# Patient Record
Sex: MALE | Race: ASIAN | NOT HISPANIC OR LATINO | Employment: FULL TIME | URBAN - METROPOLITAN AREA
[De-identification: names, ages, dates, MRNs, and addresses within clinical notes are randomized per-mention and may not be internally consistent; named-entity substitution may affect disease eponyms.]

---

## 2022-11-15 ENCOUNTER — APPOINTMENT (OUTPATIENT)
Dept: CARDIOLOGY | Facility: MEDICAL CENTER | Age: 32
End: 2022-11-15
Attending: NURSE PRACTITIONER
Payer: COMMERCIAL

## 2022-11-15 ENCOUNTER — APPOINTMENT (OUTPATIENT)
Dept: RADIOLOGY | Facility: MEDICAL CENTER | Age: 32
End: 2022-11-15
Attending: EMERGENCY MEDICINE
Payer: COMMERCIAL

## 2022-11-15 ENCOUNTER — HOSPITAL ENCOUNTER (OUTPATIENT)
Facility: MEDICAL CENTER | Age: 32
End: 2022-11-16
Attending: EMERGENCY MEDICINE | Admitting: HOSPITALIST
Payer: COMMERCIAL

## 2022-11-15 DIAGNOSIS — I25.10 CORONARY ARTERY DISEASE DUE TO CALCIFIED CORONARY LESION: ICD-10-CM

## 2022-11-15 DIAGNOSIS — R07.89 OTHER CHEST PAIN: ICD-10-CM

## 2022-11-15 DIAGNOSIS — R73.9 HYPERGLYCEMIA: ICD-10-CM

## 2022-11-15 DIAGNOSIS — I25.84 CORONARY ARTERY DISEASE DUE TO CALCIFIED CORONARY LESION: ICD-10-CM

## 2022-11-15 PROBLEM — R07.9 CHEST PAIN: Status: ACTIVE | Noted: 2022-11-15

## 2022-11-15 LAB
ALBUMIN SERPL BCP-MCNC: 5.1 G/DL (ref 3.2–4.9)
ALBUMIN/GLOB SERPL: 1.6 G/DL
ALP SERPL-CCNC: 66 U/L (ref 30–99)
ALT SERPL-CCNC: 67 U/L (ref 2–50)
ANION GAP SERPL CALC-SCNC: 13 MMOL/L (ref 7–16)
AST SERPL-CCNC: 28 U/L (ref 12–45)
BASOPHILS # BLD AUTO: 0.8 % (ref 0–1.8)
BASOPHILS # BLD: 0.06 K/UL (ref 0–0.12)
BILIRUB SERPL-MCNC: 0.6 MG/DL (ref 0.1–1.5)
BUN SERPL-MCNC: 12 MG/DL (ref 8–22)
CALCIUM SERPL-MCNC: 9.7 MG/DL (ref 8.5–10.5)
CHLORIDE SERPL-SCNC: 98 MMOL/L (ref 96–112)
CO2 SERPL-SCNC: 22 MMOL/L (ref 20–33)
CREAT SERPL-MCNC: 0.76 MG/DL (ref 0.5–1.4)
EKG IMPRESSION: NORMAL
EOSINOPHIL # BLD AUTO: 0.46 K/UL (ref 0–0.51)
EOSINOPHIL NFR BLD: 5.9 % (ref 0–6.9)
ERYTHROCYTE [DISTWIDTH] IN BLOOD BY AUTOMATED COUNT: 44.1 FL (ref 35.9–50)
EST. AVERAGE GLUCOSE BLD GHB EST-MCNC: 148 MG/DL
GFR SERPLBLD CREATININE-BSD FMLA CKD-EPI: 122 ML/MIN/1.73 M 2
GLOBULIN SER CALC-MCNC: 3.1 G/DL (ref 1.9–3.5)
GLUCOSE SERPL-MCNC: 251 MG/DL (ref 65–99)
HBA1C MFR BLD: 6.8 % (ref 4–5.6)
HCT VFR BLD AUTO: 45.9 % (ref 42–52)
HGB BLD-MCNC: 15.6 G/DL (ref 14–18)
IMM GRANULOCYTES # BLD AUTO: 0.01 K/UL (ref 0–0.11)
IMM GRANULOCYTES NFR BLD AUTO: 0.1 % (ref 0–0.9)
LYMPHOCYTES # BLD AUTO: 2.26 K/UL (ref 1–4.8)
LYMPHOCYTES NFR BLD: 29.1 % (ref 22–41)
MCH RBC QN AUTO: 33 PG (ref 27–33)
MCHC RBC AUTO-ENTMCNC: 34 G/DL (ref 33.7–35.3)
MCV RBC AUTO: 97 FL (ref 81.4–97.8)
MONOCYTES # BLD AUTO: 0.47 K/UL (ref 0–0.85)
MONOCYTES NFR BLD AUTO: 6.1 % (ref 0–13.4)
NEUTROPHILS # BLD AUTO: 4.5 K/UL (ref 1.82–7.42)
NEUTROPHILS NFR BLD: 58 % (ref 44–72)
NRBC # BLD AUTO: 0 K/UL
NRBC BLD-RTO: 0 /100 WBC
NT-PROBNP SERPL IA-MCNC: <5 PG/ML (ref 0–125)
PLATELET # BLD AUTO: 335 K/UL (ref 164–446)
PMV BLD AUTO: 9.4 FL (ref 9–12.9)
POTASSIUM SERPL-SCNC: 3.9 MMOL/L (ref 3.6–5.5)
PROT SERPL-MCNC: 8.2 G/DL (ref 6–8.2)
RBC # BLD AUTO: 4.73 M/UL (ref 4.7–6.1)
SODIUM SERPL-SCNC: 133 MMOL/L (ref 135–145)
TROPONIN T SERPL-MCNC: 7 NG/L (ref 6–19)
TROPONIN T SERPL-MCNC: 7 NG/L (ref 6–19)
TROPONIN T SERPL-MCNC: <6 NG/L (ref 6–19)
WBC # BLD AUTO: 7.8 K/UL (ref 4.8–10.8)

## 2022-11-15 PROCEDURE — 99218 PR INITIAL OBSERVATION CARE,LEVL I: CPT | Performed by: NURSE PRACTITIONER

## 2022-11-15 PROCEDURE — 99285 EMERGENCY DEPT VISIT HI MDM: CPT

## 2022-11-15 PROCEDURE — 71045 X-RAY EXAM CHEST 1 VIEW: CPT

## 2022-11-15 PROCEDURE — 85025 COMPLETE CBC W/AUTO DIFF WBC: CPT

## 2022-11-15 PROCEDURE — 93005 ELECTROCARDIOGRAM TRACING: CPT | Performed by: NURSE PRACTITIONER

## 2022-11-15 PROCEDURE — G0378 HOSPITAL OBSERVATION PER HR: HCPCS

## 2022-11-15 PROCEDURE — 96372 THER/PROPH/DIAG INJ SC/IM: CPT

## 2022-11-15 PROCEDURE — 83036 HEMOGLOBIN GLYCOSYLATED A1C: CPT

## 2022-11-15 PROCEDURE — 93005 ELECTROCARDIOGRAM TRACING: CPT | Performed by: EMERGENCY MEDICINE

## 2022-11-15 PROCEDURE — 80053 COMPREHEN METABOLIC PANEL: CPT

## 2022-11-15 PROCEDURE — A9270 NON-COVERED ITEM OR SERVICE: HCPCS | Performed by: NURSE PRACTITIONER

## 2022-11-15 PROCEDURE — 700102 HCHG RX REV CODE 250 W/ 637 OVERRIDE(OP): Performed by: NURSE PRACTITIONER

## 2022-11-15 PROCEDURE — 36415 COLL VENOUS BLD VENIPUNCTURE: CPT

## 2022-11-15 PROCEDURE — 700111 HCHG RX REV CODE 636 W/ 250 OVERRIDE (IP): Performed by: NURSE PRACTITIONER

## 2022-11-15 PROCEDURE — 83880 ASSAY OF NATRIURETIC PEPTIDE: CPT

## 2022-11-15 PROCEDURE — 93005 ELECTROCARDIOGRAM TRACING: CPT

## 2022-11-15 PROCEDURE — 84484 ASSAY OF TROPONIN QUANT: CPT

## 2022-11-15 RX ORDER — HYDRALAZINE HYDROCHLORIDE 20 MG/ML
20 INJECTION INTRAMUSCULAR; INTRAVENOUS EVERY 6 HOURS PRN
Status: DISCONTINUED | OUTPATIENT
Start: 2022-11-15 | End: 2022-11-16 | Stop reason: HOSPADM

## 2022-11-15 RX ORDER — LISINOPRIL 10 MG/1
5 TABLET ORAL DAILY
Status: DISCONTINUED | OUTPATIENT
Start: 2022-11-16 | End: 2022-11-16 | Stop reason: HOSPADM

## 2022-11-15 RX ORDER — OMEPRAZOLE 20 MG/1
20 CAPSULE, DELAYED RELEASE ORAL DAILY
Status: DISCONTINUED | OUTPATIENT
Start: 2022-11-16 | End: 2022-11-16 | Stop reason: HOSPADM

## 2022-11-15 RX ORDER — AMOXICILLIN 250 MG
2 CAPSULE ORAL 2 TIMES DAILY
Status: DISCONTINUED | OUTPATIENT
Start: 2022-11-15 | End: 2022-11-16 | Stop reason: HOSPADM

## 2022-11-15 RX ORDER — ASPIRIN 81 MG/1
324 TABLET, CHEWABLE ORAL DAILY
Status: DISCONTINUED | OUTPATIENT
Start: 2022-11-15 | End: 2022-11-15

## 2022-11-15 RX ORDER — BISACODYL 10 MG
10 SUPPOSITORY, RECTAL RECTAL
Status: DISCONTINUED | OUTPATIENT
Start: 2022-11-15 | End: 2022-11-16 | Stop reason: HOSPADM

## 2022-11-15 RX ORDER — LISINOPRIL 5 MG/1
5 TABLET ORAL DAILY
Status: ON HOLD | COMMUNITY
End: 2022-11-16 | Stop reason: SDUPTHER

## 2022-11-15 RX ORDER — METOPROLOL SUCCINATE 25 MG/1
25 TABLET, EXTENDED RELEASE ORAL DAILY
Status: ON HOLD | COMMUNITY
End: 2022-11-16 | Stop reason: SDUPTHER

## 2022-11-15 RX ORDER — HEPARIN SODIUM 5000 [USP'U]/ML
5000 INJECTION, SOLUTION INTRAVENOUS; SUBCUTANEOUS EVERY 8 HOURS
Status: DISCONTINUED | OUTPATIENT
Start: 2022-11-15 | End: 2022-11-16 | Stop reason: HOSPADM

## 2022-11-15 RX ORDER — ASPIRIN 325 MG
325 TABLET ORAL DAILY
Status: DISCONTINUED | OUTPATIENT
Start: 2022-11-15 | End: 2022-11-15

## 2022-11-15 RX ORDER — ASPIRIN 300 MG/1
300 SUPPOSITORY RECTAL DAILY
Status: DISCONTINUED | OUTPATIENT
Start: 2022-11-15 | End: 2022-11-15

## 2022-11-15 RX ORDER — ATORVASTATIN CALCIUM 80 MG/1
80 TABLET, FILM COATED ORAL NIGHTLY
Status: DISCONTINUED | OUTPATIENT
Start: 2022-11-15 | End: 2022-11-16 | Stop reason: HOSPADM

## 2022-11-15 RX ORDER — OMEPRAZOLE 20 MG/1
20 CAPSULE, DELAYED RELEASE ORAL DAILY
COMMUNITY

## 2022-11-15 RX ORDER — POLYETHYLENE GLYCOL 3350 17 G/17G
1 POWDER, FOR SOLUTION ORAL
Status: DISCONTINUED | OUTPATIENT
Start: 2022-11-15 | End: 2022-11-16 | Stop reason: HOSPADM

## 2022-11-15 RX ORDER — ATORVASTATIN CALCIUM 80 MG/1
80 TABLET, FILM COATED ORAL NIGHTLY
Status: ON HOLD | COMMUNITY
End: 2022-11-16 | Stop reason: SDUPTHER

## 2022-11-15 RX ADMIN — TICAGRELOR 90 MG: 90 TABLET ORAL at 18:25

## 2022-11-15 RX ADMIN — HEPARIN SODIUM 5000 UNITS: 5000 INJECTION, SOLUTION INTRAVENOUS; SUBCUTANEOUS at 22:00

## 2022-11-15 RX ADMIN — ATORVASTATIN CALCIUM 80 MG: 80 TABLET, FILM COATED ORAL at 22:00

## 2022-11-15 ASSESSMENT — LIFESTYLE VARIABLES
TOTAL SCORE: 0
HAVE YOU EVER FELT YOU SHOULD CUT DOWN ON YOUR DRINKING: NO
TOTAL SCORE: 0
HOW MANY TIMES IN THE PAST YEAR HAVE YOU HAD 5 OR MORE DRINKS IN A DAY: 0
ON A TYPICAL DAY WHEN YOU DRINK ALCOHOL HOW MANY DRINKS DO YOU HAVE: 1
AVERAGE NUMBER OF DAYS PER WEEK YOU HAVE A DRINK CONTAINING ALCOHOL: 1
ALCOHOL_USE: YES
TOTAL SCORE: 0
HAVE PEOPLE ANNOYED YOU BY CRITICIZING YOUR DRINKING: NO
EVER FELT BAD OR GUILTY ABOUT YOUR DRINKING: NO
CONSUMPTION TOTAL: NEGATIVE
EVER HAD A DRINK FIRST THING IN THE MORNING TO STEADY YOUR NERVES TO GET RID OF A HANGOVER: NO
DOES PATIENT WANT TO STOP DRINKING: NO

## 2022-11-15 ASSESSMENT — ENCOUNTER SYMPTOMS
SHORTNESS OF BREATH: 1
HEADACHES: 1
FOCAL WEAKNESS: 0
DIARRHEA: 0
COUGH: 0
NECK PAIN: 1
CHILLS: 0
FALLS: 0
FEVER: 0
NAUSEA: 0
DIZZINESS: 1
VOMITING: 1

## 2022-11-15 ASSESSMENT — PAIN DESCRIPTION - PAIN TYPE
TYPE: ACUTE PAIN
TYPE: ACUTE PAIN

## 2022-11-15 ASSESSMENT — HEART SCORE
TROPONIN: LESS THAN OR EQUAL TO NORMAL LIMIT
AGE: <45
HISTORY: MODERATELY SUSPICIOUS
HEART SCORE: 3
RISK FACTORS: >2 RISK FACTORS OR HX OF ATHEROSCLEROTIC DISEASE
ECG: NORMAL

## 2022-11-15 ASSESSMENT — PATIENT HEALTH QUESTIONNAIRE - PHQ9
1. LITTLE INTEREST OR PLEASURE IN DOING THINGS: NOT AT ALL
SUM OF ALL RESPONSES TO PHQ9 QUESTIONS 1 AND 2: 0
2. FEELING DOWN, DEPRESSED, IRRITABLE, OR HOPELESS: NOT AT ALL

## 2022-11-15 NOTE — H&P
Hospital Medicine History & Physical Note    Date of Service  11/15/2022    Primary Care Physician  No primary care provider on file.    Consultants  none      Code Status  Full Code    Chief Complaint  Chief Complaint   Patient presents with    Dizziness    N/V    Shortness of Breath     On/off for months        History of Presenting Illness  Isidro Crow is a 32 y.o. male who presented 11/15/2022 for evaluation of acute chest pain and shortness of breath.  Patient states that yesterday morning when he awoke he noted 5 out of 10 left-sided chest pain extending up into his neck.  States that it occurred when he was waking up, also had episode of dizziness and 1 episode of emesis.  Following this he went back to sleep and pain did improve down to a 3 out of 10 with some persistent dizziness.  Talking to on-call nurse he presented to the emergency department for additional work-up per their recommendation.  Patient does have a history of myocardial infarction in February 2022.  Patient was flying from [place] to [place], when he came off the plane he had crushing chest pain, shortness of breath and syncope, was admitted emergently to [facility] and underwent a cardiac catheterization with 3 stents placed.  Since that time patient has been Yazdanism about taking his medications, recently moved to the [facility] approximately 2 months ago and has not been able to establish care with a primary or a cardiologist.  Additionally patient is reporting intermittent episodes of shortness of breath that have been occurring over the last few months since moving to the [facility].  Currently patient is denying any chest pain, vital signs are stable.    I discussed the plan of care with patient and family.    Review of Systems  Review of Systems   Constitutional:  Negative for chills, fever and malaise/fatigue.   Respiratory:  Positive for shortness of breath. Negative for cough.     Cardiovascular:  Positive for chest pain. Negative for leg swelling.   Gastrointestinal:  Positive for vomiting. Negative for diarrhea and nausea.   Genitourinary:  Negative for dysuria.   Musculoskeletal:  Positive for neck pain. Negative for falls.   Neurological:  Positive for dizziness and headaches. Negative for focal weakness.     Past Medical History   has a past medical history of MI (myocardial infarction) (HCC).    Surgical History   has no past surgical history on file.     Family History  family history is not on file.   Family history reviewed with patient. There is family history that is pertinent to the chief complaint.     Social History       Allergies  No Known Allergies    Medications  Prior to Admission Medications   Prescriptions Last Dose Informant Patient Reported? Taking?   aspirin EC (ECOTRIN) 81 MG Tablet Delayed Response 11/15/2022 at 0730  Yes Yes   Sig: Take 1 Tablet by mouth every day.   atorvastatin (LIPITOR) 80 MG tablet 11/14/2022 at 2130  Yes Yes   Sig: Take 1 Tablet by mouth every evening.   lisinopril (PRINIVIL) 5 MG Tab 11/15/2022 at 0730  Yes Yes   Sig: Take 1 Tablet by mouth every day.   metoprolol SR (TOPROL XL) 25 MG TABLET SR 24 HR 11/15/2022 at 0730  Yes Yes   Sig: Take 1 Tablet by mouth every day.   omeprazole (PRILOSEC) 20 MG delayed-release capsule 11/15/2022 at 0730  Yes Yes   Sig: Take 1 Capsule by mouth every day.   ticagrelor (BRILINTA) 90 MG Tab tablet 11/15/2022 at 0730  Yes Yes   Sig: Take 1 Tablet by mouth 2 times a day.      Facility-Administered Medications: None       Physical Exam  Temp:  [36.6 °C (97.9 °F)] 36.6 °C (97.9 °F)  Pulse:  [81] 81  Resp:  [18] 18  BP: (138)/(90) 138/90  SpO2:  [99 %] 99 %  Blood Pressure: (!) 138/90   Temperature: 36.6 °C (97.9 °F)   Pulse: 81   Respiration: 18   Pulse Oximetry: 99 %       Physical Exam  Vitals and nursing note reviewed.   Constitutional:       General: He is not in acute distress.     Appearance: He is  well-developed and overweight. He is not ill-appearing.   HENT:      Head: Normocephalic and atraumatic.      Mouth/Throat:      Mouth: Mucous membranes are moist.   Cardiovascular:      Rate and Rhythm: Normal rate and regular rhythm.      Pulses: Normal pulses.      Heart sounds: Heart sounds are distant. No murmur heard.  Pulmonary:      Effort: Pulmonary effort is normal.      Breath sounds: Normal breath sounds. No decreased air movement. No wheezing.   Abdominal:      General: Abdomen is protuberant. There is no distension.      Palpations: Abdomen is soft.      Tenderness: There is no abdominal tenderness. There is no guarding.   Musculoskeletal:      Right lower leg: No edema.      Left lower leg: No edema.   Neurological:      General: No focal deficit present.      Mental Status: He is alert and oriented to person, place, and time.      Cranial Nerves: No cranial nerve deficit.      Motor: No weakness.   Psychiatric:         Attention and Perception: Attention normal.         Mood and Affect: Mood normal.         Speech: Speech normal.         Behavior: Behavior normal. Behavior is cooperative.       Laboratory:  Recent Labs     11/15/22  1207   WBC 7.8   RBC 4.73   HEMOGLOBIN 15.6   HEMATOCRIT 45.9   MCV 97.0   MCH 33.0   MCHC 34.0   RDW 44.1   PLATELETCT 335   MPV 9.4     Recent Labs     11/15/22  1207   SODIUM 133*   POTASSIUM 3.9   CHLORIDE 98   CO2 22   GLUCOSE 251*   BUN 12   CREATININE 0.76   CALCIUM 9.7     Recent Labs     11/15/22  1207   ALTSGPT 67*   ASTSGOT 28   ALKPHOSPHAT 66   TBILIRUBIN 0.6   GLUCOSE 251*         Recent Labs     11/15/22  1207   NTPROBNP <5         Recent Labs     11/15/22  1207   TROPONINT 7       Imaging:  DX-CHEST-PORTABLE (1 VIEW)   Final Result      1.  No acute cardiac or pulmonary abnormalities are identified.      EC-ECHOCARDIOGRAM COMPLETE W/O CONT    (Results Pending)       X-Ray:  My impression is: no acute cardio pulmonary process noted  EKG:  I have reviewed,  "unable to compare to prior studies    Assessment/Plan:  Justification for Admission Status  I anticipate this patient is appropriate for observation status at this time because chest pain, rule out for cardiac cause    Patient will need a Med/Surg bed on MEDICAL service .  The need is secondary to chest pain rule out.    * Chest pain- (present on admission)  Assessment & Plan  The ASCVD Risk score (Darron CORDOVA, et al., 2019) failed to calculate for the following reasons:    The 2019 ASCVD risk score is only valid for ages 40 to 79  Patient has significant cardiac history concern for additional cardiac ischemia  Will be admitted for additional cardiac work-up  Continuous telemetry, trend troponins, plan for stress test, given shortness of breath and cardiac history we will also order echocardiogram    Hyperglycemia  Assessment & Plan  States that he is \"borderline\" diabetic however blood sugar is 250 on admission  Check A1c      VTE prophylaxis: heparin ppx  "

## 2022-11-15 NOTE — ED PROVIDER NOTES
ED Provider Note    Scribed for Vel Bolaños M.D. by Pepito Mayfield. 11/15/2022, 1:20 PM.    Primary care provider: No primary care provider reported.  Means of arrival: Walk-in  History obtained from: Patient  History limited by: None    CHIEF COMPLAINT  Chief Complaint   Patient presents with    Dizziness    N/V    Shortness of Breath     On/off for months        HPI  Isidro Crow is a 32 y.o. male who presents to the Emergency Department for evaluation of shortness of breath. Onset yesterday. He states that he was sleeping and when he woke up he felt short of breath and dizzy. Shortly after this he had several episodes of vomiting. He describes his vomit as consisting of mainly bile. He notes he has previously been prescribed medication for acid reflux when he was living in [place]. He recently moved to [facility]. He has a history of a CAD, and he had a heart attack and stent placement in February, 2022. He notes that since this he has had intermittent chest pain and he had some chest pain yesterday.  The pain is in the center of his chest and feels like his previous cardiac chest pain.  Nothing makes it better nothing makes it worse.  He denies any fevers or recent head trauma. He has a family history of heart enlargement with his grandmother, but denies any other family history of heart issues. He is not followed by cardiology. He also has a history of sleep apnea. He admits to smoking cigarette and vaping previously, but he has now quit. There are no known alleviating or exacerbating factors.     REVIEW OF SYSTEMS  Review of Systems   Constitutional:  Negative for fever.   Respiratory:  Positive for shortness of breath.    Cardiovascular:  Positive for chest pain (intermittent).   Gastrointestinal:  Positive for vomiting.   Neurological:  Positive for dizziness.   All other systems reviewed and are negative.    PAST MEDICAL HISTORY   has a past medical history of MI (myocardial infarction)  "([facility]    SURGICAL HISTORY  patient denies any surgical history    SOCIAL HISTORY      Social History     Substance and Sexual Activity   Drug Use None noted       FAMILY HISTORY  None noted.     CURRENT MEDICATIONS  Home Medications       Reviewed by Alisa Falk R.N. (Registered Nurse) on 11/15/22 at 1153  Med List Status: Unable to Obtain     Medication Last Dose Status        Patient Eduard Taking any Medications                           ALLERGIES  No Known Allergies    PHYSICAL EXAM  VITAL SIGNS: BP (!) 138/90   Pulse 81   Temp 36.6 °C (97.9 °F) (Temporal)   Resp 18   Ht 1.727 m (5' 8\")   Wt 99.4 kg (219 lb 2.2 oz)   SpO2 99%   BMI 33.32 kg/m²   Vitals reviewed.  Constitutional: Well developed, Well nourished, No acute distress, Non-toxic appearance.   HENT: Normocephalic, Atraumatic, Bilateral external ears normal, Oropharynx moist, No oral exudates, Nose normal.   Eyes: PERRL, EOMI, Conjunctiva normal, No discharge.   Neck: Normal range of motion, No tenderness, Supple, No stridor.   Cardiovascular: Normal heart rate, Normal rhythm, No murmurs, No rubs, No gallops.   Thorax & Lungs: Normal breath sounds, No respiratory distress, No wheezing, No chest tenderness.   Abdomen: Bowel sounds normal, Soft, No tenderness  Skin: Warm, Dry, No erythema, No rash.   Back: No tenderness, No CVA tenderness.   Musculoskeletal: Good range of motion in all major joints. No edema. No tenderness to palpation or major deformities noted.   Neurologic: Alert, Normal motor function, Normal sensory function, No focal deficits noted.   Psychiatric: Affect normal    LABS  Results for orders placed or performed during the hospital encounter of 11/15/22   CBC WITH DIFFERENTIAL   Result Value Ref Range    WBC 7.8 4.8 - 10.8 K/uL    RBC 4.73 4.70 - 6.10 M/uL    Hemoglobin 15.6 14.0 - 18.0 g/dL    Hematocrit 45.9 42.0 - 52.0 %    MCV 97.0 81.4 - 97.8 fL    MCH 33.0 27.0 - 33.0 pg    MCHC 34.0 33.7 - 35.3 g/dL    RDW 44.1 35.9 - " 50.0 fL    Platelet Count 335 164 - 446 K/uL    MPV 9.4 9.0 - 12.9 fL    Neutrophils-Polys 58.00 44.00 - 72.00 %    Lymphocytes 29.10 22.00 - 41.00 %    Monocytes 6.10 0.00 - 13.40 %    Eosinophils 5.90 0.00 - 6.90 %    Basophils 0.80 0.00 - 1.80 %    Immature Granulocytes 0.10 0.00 - 0.90 %    Nucleated RBC 0.00 /100 WBC    Neutrophils (Absolute) 4.50 1.82 - 7.42 K/uL    Lymphs (Absolute) 2.26 1.00 - 4.80 K/uL    Monos (Absolute) 0.47 0.00 - 0.85 K/uL    Eos (Absolute) 0.46 0.00 - 0.51 K/uL    Baso (Absolute) 0.06 0.00 - 0.12 K/uL    Immature Granulocytes (abs) 0.01 0.00 - 0.11 K/uL    NRBC (Absolute) 0.00 K/uL   COMP METABOLIC PANEL   Result Value Ref Range    Sodium 133 (L) 135 - 145 mmol/L    Potassium 3.9 3.6 - 5.5 mmol/L    Chloride 98 96 - 112 mmol/L    Co2 22 20 - 33 mmol/L    Anion Gap 13.0 7.0 - 16.0    Glucose 251 (H) 65 - 99 mg/dL    Bun 12 8 - 22 mg/dL    Creatinine 0.76 0.50 - 1.40 mg/dL    Calcium 9.7 8.5 - 10.5 mg/dL    AST(SGOT) 28 12 - 45 U/L    ALT(SGPT) 67 (H) 2 - 50 U/L    Alkaline Phosphatase 66 30 - 99 U/L    Total Bilirubin 0.6 0.1 - 1.5 mg/dL    Albumin 5.1 (H) 3.2 - 4.9 g/dL    Total Protein 8.2 6.0 - 8.2 g/dL    Globulin 3.1 1.9 - 3.5 g/dL    A-G Ratio 1.6 g/dL   TROPONIN   Result Value Ref Range    Troponin T 7 6 - 19 ng/L   proBrain Natriuretic Peptide, NT   Result Value Ref Range    NT-proBNP <5 0 - 125 pg/mL   ESTIMATED GFR   Result Value Ref Range    GFR (CKD-EPI) 122 >60 mL/min/1.73 m 2   EKG   Result Value Ref Range    Report       [facility] Emergency Dept.    Test Date:  2022-11-15  Pt Name:    DAGO FITZGERALD                 Department: ER  MRN:        [id number]                      Room:  Gender:     Male                         Technician: [id number]  :        1990                   Requested By:ER TRIAGE PROTOCOL  Order #:    [id number]                    Reading MD: ALBER NATHAN. AMD    Measurements  Intervals                                Axis  Rate:        79                           P:          52  AR:         181                          QRS:        81  QRSD:       87                           T:          46  QT:         352  QTc:        404    Interpretive Statements  Sinus rhythm  Borderline ST elevation, anterior leads  No previous ECG available for comparison  Electronically Signed On 11- 13:28:08 PST by ALBER NATHAN. [facility]        All labs reviewed by me.    EKG Interpretation  Interpreted by me as above.     RADIOLOGY  DX-CHEST-PORTABLE (1 VIEW)   Final Result      1.  No acute cardiac or pulmonary abnormalities are identified.        The radiologist's interpretation of all radiological studies have been reviewed by me.    COURSE & MEDICAL DECISION MAKING  Pertinent Labs & Imaging studies reviewed. (See chart for details)    Obtained and reviewed past medical records.    1:20 PM Patient seen and examined at bedside. The patient presents with shortness of breath, and the differential diagnosis includes but is not limited to ACS, pneumonia pneumothorax and PE.  Chest wall pain, Anxiety. Ordered for EKG, DX-Chest, CBC with differential, CMP, Troponin, and proBNP to evaluate.     Patient is no evidence of pneumonia.  Clinical history does not suggest a dissection.  Nausea is benign without any tenderness or peritonitis.  The patient has a history of 3 cardiac stents reports compliance with his medications.  The patient has known CAD.  His heart score is 4.  Because of his known heart disease and ongoing chest pain he requires at least observation troponins may be a stress test.  I discussed case with the hospitalist and the patient hospitalist for further work-up and treatment.  He been having a multitude of symptoms that are crescendoing and this is concerning.      1:57 PM Paged [facility] Hospitalist.    2:02 PM I discussed the patient's case and the above findings with [facility] Hospitalist who agrees to evaluate the patient for hospitalization.           DISPOSITION:  Patient will be hospitalized in the [facility]  in guarded condition.     FINAL IMPRESSION  1. Other chest pain    2. Coronary artery disease due to calcified coronary lesion          Pepito PHILIP (Scribe), am scribing for, and in the presence of, Vel Bolaños M.D..    Electronically signed by: Pepito Mayfield (Scribe), 11/15/2022    Vel PHILIP M.D. personally performed the services described in this documentation, as scribed by Pepito Mayfield in my presence, and it is both accurate and complete.    The note accurately reflects work and decisions made by me.  Vel Bolaños M.D.  11/15/2022  3:10 PM

## 2022-11-15 NOTE — ED TRIAGE NOTES
Chief Complaint   Patient presents with    Dizziness    N/V    Shortness of Breath     On/off since after cardiac stent      Pt ambulated to triage with above complaints. Pt said that he had recent heart attack beginning of this year with stent placement x3. Since procedure he has been having sob on/off .

## 2022-11-15 NOTE — ASSESSMENT & PLAN NOTE
The ASCVD Risk score (Darron CORDOVA, et al., 2019) failed to calculate for the following reasons:    The 2019 ASCVD risk score is only valid for ages 40 to 79  Patient has significant cardiac history concern for additional cardiac ischemia  Will be admitted for additional cardiac work-up  Continuous telemetry, trend troponins, plan for stress test, given shortness of breath and cardiac history we will also order echocardiogram

## 2022-11-15 NOTE — ED NOTES
Pt had stent placed in feb, reports SOB yesterday, denies today, dizziness this morning, denies  Currently  Pt reports he called a nurse hot line and was told to come to ER for EKG

## 2022-11-15 NOTE — ED NOTES
Moved to room 2 for cardiac monitoring, Pt placed on cardiac monitor, continuous pulse ox and BP. Call light in reach, side rails up, bed locked and in lowest position, warm blanket provided. Denies any needs at this time. No acute distress noted.

## 2022-11-15 NOTE — ED NOTES
Med rec updated and complete. Allergies reviewed. Confirmed name and date of birth.        No antibiotic use in last 30 days        Home pharmacy [facility] [phone]

## 2022-11-16 ENCOUNTER — APPOINTMENT (OUTPATIENT)
Dept: CARDIOLOGY | Facility: MEDICAL CENTER | Age: 32
End: 2022-11-16
Attending: NURSE PRACTITIONER
Payer: COMMERCIAL

## 2022-11-16 ENCOUNTER — APPOINTMENT (OUTPATIENT)
Dept: RADIOLOGY | Facility: MEDICAL CENTER | Age: 32
End: 2022-11-16
Attending: NURSE PRACTITIONER
Payer: COMMERCIAL

## 2022-11-16 VITALS
WEIGHT: 219.14 LBS | HEIGHT: 68 IN | DIASTOLIC BLOOD PRESSURE: 68 MMHG | HEART RATE: 122 BPM | RESPIRATION RATE: 18 BRPM | OXYGEN SATURATION: 94 % | SYSTOLIC BLOOD PRESSURE: 112 MMHG | TEMPERATURE: 98.3 F | BODY MASS INDEX: 33.21 KG/M2

## 2022-11-16 LAB
ANION GAP SERPL CALC-SCNC: 16 MMOL/L (ref 7–16)
BUN SERPL-MCNC: 11 MG/DL (ref 8–22)
CALCIUM SERPL-MCNC: 9.6 MG/DL (ref 8.5–10.5)
CHLORIDE SERPL-SCNC: 99 MMOL/L (ref 96–112)
CHOLEST SERPL-MCNC: 181 MG/DL (ref 100–199)
CO2 SERPL-SCNC: 20 MMOL/L (ref 20–33)
CREAT SERPL-MCNC: 0.8 MG/DL (ref 0.5–1.4)
EKG IMPRESSION: NORMAL
EKG IMPRESSION: NORMAL
ERYTHROCYTE [DISTWIDTH] IN BLOOD BY AUTOMATED COUNT: 43.2 FL (ref 35.9–50)
GFR SERPLBLD CREATININE-BSD FMLA CKD-EPI: 121 ML/MIN/1.73 M 2
GLUCOSE SERPL-MCNC: 147 MG/DL (ref 65–99)
HCT VFR BLD AUTO: 47.4 % (ref 42–52)
HDLC SERPL-MCNC: 36 MG/DL
HGB BLD-MCNC: 16.1 G/DL (ref 14–18)
LDLC SERPL CALC-MCNC: 83 MG/DL
LV EJECT FRACT MOD 2C 99903: 39.67
LV EJECT FRACT MOD 4C 99902: 48.86
LV EJECT FRACT MOD BP 99901: 45.89
MCH RBC QN AUTO: 32.7 PG (ref 27–33)
MCHC RBC AUTO-ENTMCNC: 34 G/DL (ref 33.7–35.3)
MCV RBC AUTO: 96.1 FL (ref 81.4–97.8)
PLATELET # BLD AUTO: 348 K/UL (ref 164–446)
PMV BLD AUTO: 9.3 FL (ref 9–12.9)
POTASSIUM SERPL-SCNC: 4 MMOL/L (ref 3.6–5.5)
RBC # BLD AUTO: 4.93 M/UL (ref 4.7–6.1)
SODIUM SERPL-SCNC: 135 MMOL/L (ref 135–145)
TRIGL SERPL-MCNC: 311 MG/DL (ref 0–149)
WBC # BLD AUTO: 8.5 K/UL (ref 4.8–10.8)

## 2022-11-16 PROCEDURE — A9270 NON-COVERED ITEM OR SERVICE: HCPCS | Performed by: NURSE PRACTITIONER

## 2022-11-16 PROCEDURE — 93306 TTE W/DOPPLER COMPLETE: CPT | Mod: 26 | Performed by: STUDENT IN AN ORGANIZED HEALTH CARE EDUCATION/TRAINING PROGRAM

## 2022-11-16 PROCEDURE — G0378 HOSPITAL OBSERVATION PER HR: HCPCS

## 2022-11-16 PROCEDURE — 80061 LIPID PANEL: CPT

## 2022-11-16 PROCEDURE — 93010 ELECTROCARDIOGRAM REPORT: CPT | Mod: 76 | Performed by: STUDENT IN AN ORGANIZED HEALTH CARE EDUCATION/TRAINING PROGRAM

## 2022-11-16 PROCEDURE — 99217 PR OBSERVATION CARE DISCHARGE: CPT | Mod: FS | Performed by: NURSE PRACTITIONER

## 2022-11-16 PROCEDURE — A9502 TC99M TETROFOSMIN: HCPCS

## 2022-11-16 PROCEDURE — 90686 IIV4 VACC NO PRSV 0.5 ML IM: CPT | Performed by: NURSE PRACTITIONER

## 2022-11-16 PROCEDURE — 80048 BASIC METABOLIC PNL TOTAL CA: CPT

## 2022-11-16 PROCEDURE — 700102 HCHG RX REV CODE 250 W/ 637 OVERRIDE(OP): Performed by: NURSE PRACTITIONER

## 2022-11-16 PROCEDURE — 90471 IMMUNIZATION ADMIN: CPT

## 2022-11-16 PROCEDURE — 700111 HCHG RX REV CODE 636 W/ 250 OVERRIDE (IP): Performed by: NURSE PRACTITIONER

## 2022-11-16 PROCEDURE — 96372 THER/PROPH/DIAG INJ SC/IM: CPT

## 2022-11-16 PROCEDURE — 93306 TTE W/DOPPLER COMPLETE: CPT

## 2022-11-16 PROCEDURE — 85027 COMPLETE CBC AUTOMATED: CPT

## 2022-11-16 RX ORDER — OMEGA-3 FATTY ACIDS/FISH OIL 300-1000MG
1000 CAPSULE ORAL 2 TIMES DAILY
Qty: 180 CAPSULE | Refills: 0 | Status: SHIPPED | OUTPATIENT
Start: 2022-11-16 | End: 2023-02-14

## 2022-11-16 RX ORDER — OMEGA-3/DHA/EPA/FISH OIL 300-1000MG
1000 CAPSULE ORAL 2 TIMES DAILY
Status: CANCELLED | COMMUNITY
Start: 2022-11-16

## 2022-11-16 RX ORDER — METOPROLOL SUCCINATE 25 MG/1
25 TABLET, EXTENDED RELEASE ORAL DAILY
Qty: 90 TABLET | Refills: 0 | Status: SHIPPED | OUTPATIENT
Start: 2022-11-16 | End: 2023-02-14

## 2022-11-16 RX ORDER — ATORVASTATIN CALCIUM 80 MG/1
80 TABLET, FILM COATED ORAL NIGHTLY
Qty: 90 TABLET | Refills: 0 | Status: SHIPPED | OUTPATIENT
Start: 2022-11-16 | End: 2023-02-14

## 2022-11-16 RX ORDER — LISINOPRIL 5 MG/1
5 TABLET ORAL DAILY
Qty: 90 TABLET | Refills: 0 | Status: SHIPPED | OUTPATIENT
Start: 2022-11-16 | End: 2023-02-14

## 2022-11-16 RX ADMIN — OMEPRAZOLE 20 MG: 20 CAPSULE, DELAYED RELEASE ORAL at 05:11

## 2022-11-16 RX ADMIN — HEPARIN SODIUM 5000 UNITS: 5000 INJECTION, SOLUTION INTRAVENOUS; SUBCUTANEOUS at 05:10

## 2022-11-16 RX ADMIN — ASPIRIN 81 MG: 81 TABLET, COATED ORAL at 05:10

## 2022-11-16 RX ADMIN — LISINOPRIL 5 MG: 10 TABLET ORAL at 05:12

## 2022-11-16 RX ADMIN — TICAGRELOR 90 MG: 90 TABLET ORAL at 05:11

## 2022-11-16 RX ADMIN — INFLUENZA A VIRUS A/VICTORIA/2570/2019 IVR-215 (H1N1) ANTIGEN (FORMALDEHYDE INACTIVATED), INFLUENZA A VIRUS A/DARWIN/9/2021 SAN-010 (H3N2) ANTIGEN (FORMALDEHYDE INACTIVATED), INFLUENZA B VIRUS B/PHUKET/3073/2013 ANTIGEN (FORMALDEHYDE INACTIVATED), AND INFLUENZA B VIRUS B/MICHIGAN/01/2021 ANTIGEN (FORMALDEHYDE INACTIVATED) 0.5 ML: 15; 15; 15; 15 INJECTION, SUSPENSION INTRAMUSCULAR at 14:14

## 2022-11-16 ASSESSMENT — PAIN DESCRIPTION - PAIN TYPE: TYPE: ACUTE PAIN

## 2022-11-16 NOTE — PROGRESS NOTES
Pt discharged with discharge paperwork. Reviewed medications and appointments with pt. Pt and pt's wife acknowledged all instructions. Pt preferred to ambulate off the unit with family and belongings.

## 2022-11-16 NOTE — PROGRESS NOTES
1939- This RN messaged CELINA Toure. This RN was looking at pt's EKG done and noticed some ST elevation. Pt denies any symptoms at the moment.       1940- This RN did not received any orders.

## 2022-11-16 NOTE — PROGRESS NOTES
Pt ate chocolate cake with his dinner tray, supposed to be getting treadmill stress test tomorrow. Diet order changed, pt's wife instructed to bring in sneakers for stress test tomorrow, pt updated not to eat chocolate or drink any caff. Will pass along to nightshift RN.    · Continue home statin

## 2022-11-16 NOTE — DISCHARGE SUMMARY
Discharge Summary    CHIEF COMPLAINT ON ADMISSION  Chief Complaint   Patient presents with    Dizziness    N/V    Shortness of Breath     On/off for months        Reason for Admission  Sent by MD     Admission Date  11/15/2022    CODE STATUS  Full Code    HPI & HOSPITAL COURSE    Isidro Crow is a 32 y.o. male who presented 11/15/2022 for evaluation of acute chest pain and shortness of breath.  Patient states that yesterday morning when he awoke he noted 5 out of 10 left-sided chest pain extending up into his neck.  States that it occurred when he was waking up, also had episode of dizziness and 1 episode of emesis.  Following this he went back to sleep and pain did improve down to a 3 out of 10 with some persistent dizziness.  Talking to on-call nurse he presented to the emergency department for additional work-up per their recommendation.  Patient does have a history of myocardial infarction in February 2022.  Patient was flying from [place] to [place], when he came off the plane he had crushing chest pain, shortness of breath and syncope, was admitted emergently to [facility] and underwent a cardiac catheterization with 3 stents placed.  Since that time patient has been Quaker about taking his medications, recently moved to the [facility] approximately 2 months ago and has not been able to establish care with a primary or a cardiologist.  Additionally patient is reporting intermittent episodes of shortness of breath that have been occurring over the last few months since moving to the [facility].  The emergency department EKG had ST elevation in anterior leads, no baseline EKG to compare with.  Given cardiac history patient was admitted for additional cardiac work-up.      Underwent echocardiogram which showed normal LV function, EF of 55%, normal RV size and function, and no valvular abnormalities.  Patient remained on telemetry monitor with no events  overnight.  Troponins x3 were negative.  Also underwent nuclear medicine stress test which showed no area of infarction or ischemia.  Panel was checked, did show elevated triglycerides, patient already on max dose of atorvastatin, recommended adding omega-3 and following up with a primary.  If repeat lipid panel does not show improvement would recommend additional review per PCP's recommendations.  Additionally A1c noted to be elevated at 6.8, patient started on metformin, discussed importance of diet and exercise being incorporated into his lifestyle.    I have performed a physical exam and reviewed and updated ROS and Plan today (11/16/2022). In review of yesterday's note (11/15/2022), there are no changes except as documented above.      Therefore, he is discharged in good and stable condition to home with close outpatient follow-up.    The patient recovered much more quickly than anticipated on admission.    Discharge Date  11/16/2022    FOLLOW UP ITEMS POST DISCHARGE  Elevated triglycerides, continue high-dose atorvastatin, start omega-3 twice a day, follow-up with primary, may need to adjust medications for better control    New diagnosis diabetes, A1c elevated to 6.8, start metformin, establish care with PCP and follow-up    Courage diet and exercise changes for both high cholesterol and elevated glucose.    DISCHARGE DIAGNOSES  Principal Problem:    Chest pain POA: Yes  Active Problems:    Hyperglycemia POA: Unknown  Resolved Problems:    * No resolved hospital problems. *      FOLLOW UP  No future appointments.  No follow-up provider specified.    MEDICATIONS ON DISCHARGE     Medication List        START taking these medications        Instructions   metFORMIN 500 MG Tabs  Commonly known as: GLUCOPHAGE   Take 1 Tablet by mouth 2 times a day with meals for 90 days.  Dose: 500 mg     Omega 3 1000 MG Caps   Take 1,000 mg by mouth 2 times a day for 90 days.  Dose: 1,000 mg            CONTINUE taking these  medications        Instructions   aspirin EC 81 MG Tbec  Commonly known as: ECOTRIN   Take 1 Tablet by mouth every day.  Dose: 81 mg     atorvastatin 80 MG tablet  Commonly known as: LIPITOR   Take 1 Tablet by mouth every evening for 90 days.  Dose: 80 mg     lisinopril 5 MG Tabs  Commonly known as: PRINIVIL   Take 1 Tablet by mouth every day for 90 days.  Dose: 5 mg     metoprolol SR 25 MG Tb24  Commonly known as: TOPROL XL   Take 1 Tablet by mouth every day for 90 days.  Dose: 25 mg     omeprazole 20 MG delayed-release capsule  Commonly known as: PRILOSEC   Take 1 Capsule by mouth every day.  Dose: 20 mg     ticagrelor 90 MG Tabs tablet  Commonly known as: Brilinta   Take 1 Tablet by mouth 2 times a day for 90 days.  Dose: 90 mg              Allergies  No Known Allergies    DIET  Orders Placed This Encounter   Procedures    Diet Order Diet: Cardiac; Miscellaneous modifications: (optional): No Decaf, No Caffeine(for test)     Standing Status:   Standing     Number of Occurrences:   1     Order Specific Question:   Diet:     Answer:   Cardiac [6]     Order Specific Question:   Miscellaneous modifications: (optional)     Answer:   No Decaf, No Caffeine(for test) [11]       ACTIVITY  As tolerated.  Weight bearing as tolerated    CONSULTATIONS  None    PROCEDURES  Treadmill Stress Test  Echocardiogram    LABORATORY  Lab Results   Component Value Date    SODIUM 135 11/16/2022    POTASSIUM 4.0 11/16/2022    CHLORIDE 99 11/16/2022    CO2 20 11/16/2022    GLUCOSE 147 (H) 11/16/2022    BUN 11 11/16/2022    CREATININE 0.80 11/16/2022        Lab Results   Component Value Date    WBC 8.5 11/16/2022    HEMOGLOBIN 16.1 11/16/2022    HEMATOCRIT 47.4 11/16/2022    PLATELETCT 348 11/16/2022      NM-CARDIAC STRESS TEST   Final Result      EC-ECHOCARDIOGRAM COMPLETE W/O CONT   Final Result      DX-CHEST-PORTABLE (1 VIEW)   Final Result      1.  No acute cardiac or pulmonary abnormalities are identified.           Total time of the  discharge process took 34 minutes.

## 2022-11-16 NOTE — HOSPITAL COURSE
Isidro Crow is a 32 y.o. male who presented 11/15/2022 for evaluation of acute chest pain and shortness of breath.  Patient states that yesterday morning when he awoke he noted 5 out of 10 left-sided chest pain extending up into his neck.  States that it occurred when he was waking up, also had episode of dizziness and 1 episode of emesis.  Following this he went back to sleep and pain did improve down to a 3 out of 10 with some persistent dizziness.  Talking to on-call nurse he presented to the emergency department for additional work-up per their recommendation.  Patient does have a history of myocardial infarction in February 2022.  Patient was flying from [place] to [place], when he came off the plane he had crushing chest pain, shortness of breath and syncope, was admitted emergently to [facility] and underwent a cardiac catheterization with 3 stents placed.  Since that time patient has been Alevism about taking his medications, recently moved to the [facility] approximately 2 months ago and has not been able to establish care with a primary or a cardiologist.  Additionally patient is reporting intermittent episodes of shortness of breath that have been occurring over the last few months since moving to the [facility].  The emergency department EKG had ST elevation in anterior leads, no baseline EKG to compare with.  Given cardiac history patient was admitted for additional cardiac work-up.      Underwent echocardiogram which showed normal LV function, EF of 55%, normal RV size and function, and no valvular abnormalities.  Patient remained on telemetry monitor with no events overnight.  Troponins x3 were negative.  Also underwent nuclear medicine stress test which showed no area of infarction or ischemia.  Panel was checked, did show elevated triglycerides, patient already on max dose of atorvastatin, recommended adding omega-3 and following up with a primary.   If repeat lipid panel does not show improvement would recommend additional review per PCP's recommendations.  Additionally A1c noted to be elevated at 6.8, patient started on metformin, discussed importance of diet and exercise being incorporated into his lifestyle.

## 2022-11-16 NOTE — H&P
DOS 11/15/22         “IEvert reviewed patients presenting complaint(s) and I have discussed and agree with plan of care as laid out by GUICHO jasso

## 2022-11-16 NOTE — PROGRESS NOTES
4 Eyes Skin Assessment Completed by ANNETTE jenkins and jaycob, EMT-A.    Head WDL  Ears WDL  Nose WDL  Mouth WDL  Neck WDL  Breast/Chest WDL  Shoulder Blades WDL  Spine WDL  (R) Arm/Elbow/Hand WDL  (L) Arm/Elbow/Hand WDL  Abdomen WDL  Groin WDL  Scrotum/Coccyx/Buttocks WDL  (R) Leg WDL  (L) Leg WDL  (R) Heel/Foot/Toe WDL  (L) Heel/Foot/Toe WDL          Devices In Places Tele Box, Blood Pressure Cuff, and Pulse Ox      Interventions In Place N/A    Possible Skin Injury No    Pictures Uploaded Into Epic N/A  Wound Consult Placed N/A  RN Wound Prevention Protocol Ordered No

## 2022-12-02 ENCOUNTER — OFFICE VISIT (OUTPATIENT)
Dept: MEDICAL GROUP | Facility: PHYSICIAN GROUP | Age: 32
End: 2022-12-02
Payer: COMMERCIAL

## 2022-12-02 VITALS
TEMPERATURE: 98.3 F | HEART RATE: 86 BPM | OXYGEN SATURATION: 96 % | WEIGHT: 219.6 LBS | SYSTOLIC BLOOD PRESSURE: 114 MMHG | BODY MASS INDEX: 33.28 KG/M2 | DIASTOLIC BLOOD PRESSURE: 78 MMHG | HEIGHT: 68 IN

## 2022-12-02 DIAGNOSIS — K64.8 OTHER HEMORRHOIDS: ICD-10-CM

## 2022-12-02 DIAGNOSIS — Z00.00 ENCOUNTER FOR MEDICAL EXAMINATION TO ESTABLISH CARE: ICD-10-CM

## 2022-12-02 DIAGNOSIS — E11.59 TYPE 2 DIABETES MELLITUS WITH OTHER CIRCULATORY COMPLICATION, WITHOUT LONG-TERM CURRENT USE OF INSULIN (HCC): ICD-10-CM

## 2022-12-02 DIAGNOSIS — E78.1 HYPERTRIGLYCERIDEMIA: ICD-10-CM

## 2022-12-02 DIAGNOSIS — Z23 NEED FOR VACCINATION: ICD-10-CM

## 2022-12-02 DIAGNOSIS — G47.33 OBSTRUCTIVE SLEEP APNEA SYNDROME: ICD-10-CM

## 2022-12-02 DIAGNOSIS — I25.2 HISTORY OF ACUTE ANTERIOR WALL MI: ICD-10-CM

## 2022-12-02 DIAGNOSIS — I10 PRIMARY HYPERTENSION: ICD-10-CM

## 2022-12-02 PROBLEM — I21.09 ACUTE ANTERIOR WALL MI (HCC): Status: ACTIVE | Noted: 2022-12-02

## 2022-12-02 PROCEDURE — 90746 HEPB VACCINE 3 DOSE ADULT IM: CPT

## 2022-12-02 PROCEDURE — 99204 OFFICE O/P NEW MOD 45 MIN: CPT | Mod: 25

## 2022-12-02 PROCEDURE — 90471 IMMUNIZATION ADMIN: CPT

## 2022-12-02 PROCEDURE — 90677 PCV20 VACCINE IM: CPT

## 2022-12-02 PROCEDURE — 90472 IMMUNIZATION ADMIN EACH ADD: CPT

## 2022-12-02 PROCEDURE — 90715 TDAP VACCINE 7 YRS/> IM: CPT

## 2022-12-02 ASSESSMENT — FIBROSIS 4 INDEX: FIB4 SCORE: 0.31

## 2022-12-02 NOTE — PROGRESS NOTES
Subjective:     CC:    Chief Complaint   Patient presents with    Establish Care       HISTORY OF THE PRESENT ILLNESS: Isidro is a pleasant 32 y.o. male here today to establish care. Patient has a history of an MI and new diagnosis of Type 2 diabetes.  Patient recently moved here from [place], where he was being followed by a cardiologist, Dr. Lopez with [facility].     He recently went to the ER on 11/15/2022 for symptoms of dizziness, nausea and vomiting as well as shortness of breath.  They admitted him and did a full work-up and found he was unremarkable.  He was found to have an A1c of 6.8 and so was discharged on metformin 500 mg twice daily. He is here today for a hospital follow-up and get established.     Problem   History of Acute Anterior Wall MI    Myocardial infarction February 15 2022.  Patient was flying from [place] to [place], when he came off the plane he had crushing chest pain, shortness of breath and syncope, was admitted emergently to [facility] and underwent a cardiac catheterization with 3 stents placed.     Obstructive Sleep Apnea Syndrome    Diagnosed in  shortly before moving. He does not use a CPAP machine.   He was supposed to do a sleep study but moved before it was done.      Other Hemorrhoids    Patient denies constipation.  He has a hemorrhoid where he does notice blood. He is taking asa, which could be exacerbating the bleeding. He denies pain. He states the blood is intermittent and overall doesn't bother him.     Type 2 Diabetes Mellitus With Circulatory Disorder, Without Long-Term Current Use of Insulin (Hcc)    Patient states he eats a lot of rice and he is trying to cut down.  He doesn't have much of a sweet tooth. He is trying to cut back on starch.        Health Maintenance: Completed    ROS:   All systems negative except as addressed in assessment and plan.       Objective:     Exam: /78 (BP Location: Right arm, Patient  "Position: Sitting, BP Cuff Size: Adult)   Pulse 86   Temp 36.8 °C (98.3 °F) (Temporal)   Ht 1.727 m (5' 8\")   Wt 99.6 kg (219 lb 9.6 oz)   SpO2 96%  Body mass index is 33.39 kg/m².    Physical Exam  Constitutional:       Appearance: Normal appearance.   Cardiovascular:      Rate and Rhythm: Normal rate.   Pulmonary:      Effort: Pulmonary effort is normal.   Musculoskeletal:         General: Normal range of motion.   Neurological:      Mental Status: He is alert. Mental status is at baseline.   Psychiatric:         Mood and Affect: Mood normal.         Behavior: Behavior normal.       Labs: Reviewed from 11/16/22      Assessment & Plan:   32 y.o. male with the following -    1. Encounter for medical examination to establish care  Health conditions and medications reviewed and updated. All screenings discussed and up-to-date. Health maintenance completed.     - Lipoprotein (a); Future  - NMR LIPOPROFILE  - TSH WITH REFLEX TO FT4; Future  - VITAMIN D,25 HYDROXY (DEFICIENCY); Future    2. History of acute anterior wall MI  Patient is managed on Brilinta 90 mg twice daily, aspirin 81 mg daily and metoprolol 25 mg daily.  Patient has not yet established with a new cardiologist.  Referral placed.  Continue with current management.  - REFERRAL TO CARDIOLOGY    3. Type 2 diabetes mellitus with other circulatory complication, without long-term current use of insulin (HCC)  New problem detected on labs in ER.  A1C 6.8  Started on Metformin 500 mg BID.  Patient was educated on goal A1c less than 7.  He was provided education on diet including cutting back on carbs and sugar and exercising 30 minutes least 5 days a week.  Patient also educated on annual checks of his urine for microalbumin, his retina and microfilament exam of his feet.  Patient received pneumonia vaccine in clinic today.  Patient to return in 3 months for follow-up of his A1c.    4. Primary hypertension  Chronic, controlled.  Managed on Lisinopril 5 mg " daily and metoprolol 25 mg daily.  Continue current management.     5. Hypertriglyceridemia  Chronic, not controlled.  Managed on atorvastatin 80 mg.  He was started on this after his MI    Latest Reference Range & Units 11/16/22 02:55   Cholesterol,Tot 100 - 199 mg/dL 181   Triglycerides 0 - 149 mg/dL 311 (H)   HDL >=40 mg/dL 36 !   LDL <100 mg/dL 83     6. BMI 33.0-33.9,adult  Body mass index is 33.39 kg/m².  Continue healthy diet and lifestyle modification.    7. Obstructive sleep apnea syndrome  - Referral to Pulmonary and Sleep Medicine    8. Other hemorrhoids  Chronic, stable.  Recommended OTC hydrocortisone cream.  If becomes painful or bothersome, patient will reach out for more aggressive management.    9. Need for vaccination  - Tdap Vaccine =>6YO IM  - Pneumococcal Conjugate Vaccine 20-Valent (19 yrs+)  - Hepatitis B Vaccine Adult 20+    Patient was educated in proper administration of medication(s) ordered today including safety, possible SE, risks, benefits, rationale and alternatives to therapy.   Supportive care, differential diagnoses, and indications for immediate follow-up discussed with patient.    Pathogenesis of diagnosis discussed including typical length and natural progression.    Instructed to return to clinic or nearest emergency department for any change in condition, further concerns, or worsening of symptoms.  Patient states understanding of the plan of care and discharge instructions.    Return in about 3 months (around 3/2/2023) for A1C.    I spent a total of 50 minutes with record review, exam, and communication with the patient, communication with other providers, and documentation of this encounter. This does not include time spent on separately billable procedures/tests.    I have placed the above orders and discussed them with an approved delegating provider.  The MA is performing the below orders under the direction of Dr. Durant.    Please note that this dictation was created  using voice recognition software. I have worked with consultants from the vendor as well as technical experts from [facility] to optimize the interface. I have made every reasonable attempt to correct obvious errors, but I expect that there are errors of grammar and possibly content that I did not discover before finalizing the note.

## 2022-12-02 NOTE — ASSESSMENT & PLAN NOTE
Chronic, controlled.  Managed on Lisinopril 5 mg daily and metoprolol 25 mg daily.  Continue current management.

## 2022-12-02 NOTE — ASSESSMENT & PLAN NOTE
Chronic, not controlled.  Managed on atorvastatin 80 mg.  He was started on this after his MI    Latest Reference Range & Units 11/16/22 02:55   Cholesterol,Tot 100 - 199 mg/dL 181   Triglycerides 0 - 149 mg/dL 311 (H)   HDL >=40 mg/dL 36 !   LDL <100 mg/dL 83

## 2022-12-30 ENCOUNTER — HOSPITAL ENCOUNTER (OUTPATIENT)
Dept: LAB | Facility: MEDICAL CENTER | Age: 32
End: 2022-12-30
Payer: COMMERCIAL

## 2022-12-30 DIAGNOSIS — Z00.00 ENCOUNTER FOR MEDICAL EXAMINATION TO ESTABLISH CARE: ICD-10-CM

## 2022-12-30 LAB
25(OH)D3 SERPL-MCNC: 25 NG/ML (ref 30–100)
TSH SERPL DL<=0.005 MIU/L-ACNC: 1.55 UIU/ML (ref 0.38–5.33)

## 2022-12-30 PROCEDURE — 83704 LIPOPROTEIN BLD QUAN PART: CPT

## 2022-12-30 PROCEDURE — 80061 LIPID PANEL: CPT

## 2022-12-30 PROCEDURE — 82306 VITAMIN D 25 HYDROXY: CPT

## 2022-12-30 PROCEDURE — 83695 ASSAY OF LIPOPROTEIN(A): CPT

## 2022-12-30 PROCEDURE — 84443 ASSAY THYROID STIM HORMONE: CPT

## 2022-12-30 PROCEDURE — 36415 COLL VENOUS BLD VENIPUNCTURE: CPT

## 2023-01-04 LAB — LPA SERPL-MCNC: 14 MG/DL

## 2023-01-06 LAB
CHOLEST SERPL-MCNC: 149 MG/DL
HDL PARTICAL NO Q4363: 30.8 UMOL/L
HDL SIZE Q4361: 8.2 NM
HDLC SERPL-MCNC: 39 MG/DL (ref 40–59)
HLD.LARGE SERPL-SCNC: <2.8 UMOL/L
L VLDL PART NO Q4357: 11.2 NMOL/L
LDL SERPL QN: 20.3 NM
LDL SERPL-SCNC: 1263 NMOL/L
LDL SMALL SERPL-SCNC: 702 NMOL/L
LDLC SERPL CALC-MCNC: 62 MG/DL
PATHOLOGY STUDY: ABNORMAL
TRIGL SERPL-MCNC: 239 MG/DL (ref 30–149)
VLDL SIZE Q4362: 56.9 NM

## 2023-01-20 ENCOUNTER — OFFICE VISIT (OUTPATIENT)
Dept: CARDIOLOGY | Facility: MEDICAL CENTER | Age: 33
End: 2023-01-20
Payer: COMMERCIAL

## 2023-01-20 ENCOUNTER — TELEPHONE (OUTPATIENT)
Dept: CARDIOLOGY | Facility: MEDICAL CENTER | Age: 33
End: 2023-01-20

## 2023-01-20 VITALS
OXYGEN SATURATION: 98 % | BODY MASS INDEX: 33.34 KG/M2 | DIASTOLIC BLOOD PRESSURE: 76 MMHG | RESPIRATION RATE: 16 BRPM | SYSTOLIC BLOOD PRESSURE: 112 MMHG | HEART RATE: 82 BPM | HEIGHT: 68 IN | WEIGHT: 220 LBS

## 2023-01-20 DIAGNOSIS — I25.2 HX OF ST ELEVATION MYOCARDIAL INFARCTION: ICD-10-CM

## 2023-01-20 DIAGNOSIS — G47.33 OBSTRUCTIVE SLEEP APNEA: ICD-10-CM

## 2023-01-20 DIAGNOSIS — E78.2 MIXED HYPERLIPIDEMIA: ICD-10-CM

## 2023-01-20 PROCEDURE — 99204 OFFICE O/P NEW MOD 45 MIN: CPT | Performed by: INTERNAL MEDICINE

## 2023-01-20 ASSESSMENT — ENCOUNTER SYMPTOMS
HEMOPTYSIS: 0
FEVER: 0
HEMATOCHEZIA: 0
COUGH: 0
DIAPHORESIS: 0
WHEEZING: 0

## 2023-01-20 ASSESSMENT — FIBROSIS 4 INDEX: FIB4 SCORE: 0.31

## 2023-01-20 NOTE — PROGRESS NOTES
Cardiology Initial Consultation Note    Date of note: 1/19/2023    Primary Care Provider: LAI Becerra  Referring Provider: Juventino Aguilera A.P*    Patient Name: Isidro Crow  YOB: 1990  MRN:              [id number]    Chief Complaint   Patient presents with    New Patient     NP Dx:  History of acute anterior wall MI     History of Present Illness: Mr. Isidro Crow is a 32 y.o. male whose current medical problems include she of myocardial infarction, status post stent, diabetes mellitus, hypertension, hyperlipidemia, history of smoking recently quit, who is here for cardiac consultation for anterior wall myocardial infarction.  He was smoking prior to his heart attack, but he stopped after the heart attack.  He wore a nicotine patch for about a month and now has no    He presented 2/15/2022 with acute onset of chest pain.  His troponin initially was 187, peaked up to 5314 (peaks not clear).  He went to [place] with wife for [employer] day.  While flying back, he had sudden onset of chest pain, felt nauseated, vomited, then passed out.  He was then taken to [facility].    From card 2/16/2022:  2.75 X 26 mm Johan in LAD, 2.5 x 26 mm D1.    His total cholesterol was 251, triglyceride 281, HDL 40,     Patient was in the emergency room 11/15/22 with complaints of acute chest pain, shortness of breath.  He did rule out.  Nuclear stress test performed 11/15/2022 showed no evidence of ischemia or infarction with normal LVEF.  He exercised for 6 minutes achieving 7.5 METS.    He also had an echocardiogram done 11/15/2022:  Normal right and left ventricular systolic function.  No valvular abnormalities.  That day, he was talking with RN.  He had also dizziness, vomitted, pinch in his chest and BP was going up, .  He was then referred to ER.     Since his emergency visit, he has been doing fine no problems.  He works at [employer] in the [place] or cell phones are not  allowed.  He would feel more comfortable if he was able to have his cell phone with him so he might be able to call someone if he felt symptoms.  He does carry his nitro with him all the time.    He denies any lightheadedness, no palpitations, no syncope.  Denies any lower extreme edema, no shortness of breath, no chest pain.  He has not missed any of his medication is compliant.    Cardiovascular Risk Factors:  1. Smoking status:   Tobacco Use: Medium Risk    Smoking Tobacco Use: Former    Smokeless Tobacco Use: Never    Passive Exposure: Not on file     2. Type II Diabetes Mellitus: Yes on metformin  Lab Results   Component Value Date/Time    HBA1C 6.8 (H) 11/15/2022 02:55 PM    HBA1C 6.3 (H) 02/16/2022 04:00 AM     3. Hypertension: Yes on metoprolol succinate 25 mg p.o. daily, lisinopril 5 mg p.o. daily  4. Dyslipidemia: Yes on atorvastatin 80 mg p.o. daily  Cholesterol,Tot   Date Value Ref Range Status   12/30/2022 149 <=199 mg/dL Final   11/16/2022 181 100 - 199 mg/dL Final     LDL   Date Value Ref Range Status   11/16/2022 83 <100 mg/dL Final     HDL   Date Value Ref Range Status   12/30/2022 39 (L) 40 - 59 mg/dL Final   11/16/2022 36 (A) >=40 mg/dL Final     Triglycerides   Date Value Ref Range Status   12/30/2022 239 (H) 30 - 149 mg/dL Final   11/16/2022 311 (H) 0 - 149 mg/dL Final     5. Family history of early Coronary Artery Disease in a first degree relative (Male less than 55 years of age; Female less than 65 years of age): No family history of premature coronary artery disease    Past Medical History:   Diagnosis Date    MI (myocardial infarction) ([facility])      History reviewed. No pertinent surgical history.    Current Outpatient Medications   Medication Sig Dispense Refill    metFORMIN (GLUCOPHAGE) 500 MG Tab Take 1 Tablet by mouth 2 times a day with meals for 90 days. 180 Tablet 0    metoprolol SR (TOPROL XL) 25 MG TABLET SR 24 HR Take 1 Tablet by mouth every day for 90 days. 90 Tablet 0     "atorvastatin (LIPITOR) 80 MG tablet Take 1 Tablet by mouth every evening for 90 days. 90 Tablet 0    lisinopril (PRINIVIL) 5 MG Tab Take 1 Tablet by mouth every day for 90 days. 90 Tablet 0    ticagrelor (BRILINTA) 90 MG Tab tablet Take 1 Tablet by mouth 2 times a day for 90 days. 180 Tablet 0    Omega 3 1000 MG Cap Take 1,000 mg by mouth 2 times a day for 90 days. 180 Capsule 0    omeprazole (PRILOSEC) 20 MG delayed-release capsule Take 1 Capsule by mouth every day.      aspirin EC (ECOTRIN) 81 MG Tablet Delayed Response Take 1 Tablet by mouth every day.       No current facility-administered medications for this visit.       No Known Allergies    History reviewed. No pertinent family history.    Social History     Socioeconomic History    Marital status:    Tobacco Use    Smoking status: Former     Types: Cigarettes    Smokeless tobacco: Never   Vaping Use    Vaping Use: Former   Substance and Sexual Activity    Alcohol use: Yes     Alcohol/week: 0.6 oz     Types: 1 Cans of beer per week    Drug use: Never      Review of Systems   Constitutional: Negative for diaphoresis and fever.   Respiratory:  Negative for cough, hemoptysis and wheezing.    Gastrointestinal:  Negative for hematochezia and melena.   Genitourinary:  Negative for hematuria.     Physical Exam:  Ambulatory Vitals  /76 (BP Location: Right arm, Patient Position: Sitting, BP Cuff Size: Adult)   Pulse 82   Resp 16   Ht 1.727 m (5' 8\")   Wt 99.8 kg (220 lb)   SpO2 98%    Oxygen Therapy:  Pulse Oximetry: 98 %  BP Readings from Last 4 Encounters:   01/20/23 112/76   12/02/22 114/78   11/16/22 112/68     Weight/BMI:   Body mass index is 33.45 kg/m².  Wt Readings from Last 2 Encounters:   01/20/23 99.8 kg (220 lb)   12/02/22 99.6 kg (219 lb 9.6 oz)     Physical Exam  Constitutional:       Appearance: Normal appearance.   Eyes:      Extraocular Movements: Extraocular movements intact.      Conjunctiva/sclera: Conjunctivae normal.   Neck:    "   Vascular: No carotid bruit or JVD.   Cardiovascular:      Rate and Rhythm: Normal rate and regular rhythm.      Pulses:           Radial pulses are 1+ on the right side and 1+ on the left side.        Posterior tibial pulses are 1+ on the right side and 1+ on the left side.      Heart sounds: Normal heart sounds. No murmur heard.    No friction rub. No gallop.   Pulmonary:      Effort: Pulmonary effort is normal. No respiratory distress.      Breath sounds: Normal breath sounds. No wheezing or rales.   Abdominal:      General: Bowel sounds are normal.      Palpations: Abdomen is soft.   Musculoskeletal:      Cervical back: Neck supple.      Right lower leg: No edema.      Left lower leg: No edema.   Skin:     General: Skin is warm and dry.   Neurological:      Mental Status: He is alert and oriented to person, place, and time. Mental status is at baseline.   Psychiatric:         Mood and Affect: Mood normal.        Lab Data Review:  Lab Results   Component Value Date/Time    SODIUM 135 11/16/2022 02:55 AM    POTASSIUM 4.0 11/16/2022 02:55 AM    CHLORIDE 99 11/16/2022 02:55 AM    CO2 20 11/16/2022 02:55 AM    GLUCOSE 147 (H) 11/16/2022 02:55 AM    BUN 11 11/16/2022 02:55 AM    CREATININE 0.80 11/16/2022 02:55 AM     Lab Results   Component Value Date/Time    ALKPHOSPHAT 66 11/15/2022 12:07 PM    ASTSGOT 28 11/15/2022 12:07 PM    ALTSGPT 67 (H) 11/15/2022 12:07 PM    TBILIRUBIN 0.6 11/15/2022 12:07 PM      Lab Results   Component Value Date/Time    WBC 8.5 11/16/2022 02:55 AM     Lab Results   Component Value Date/Time    TSHULTRASEN 1.550 12/30/2022 10:43 AM        Cardiac Imaging and Procedures Review:    EKG dated 11/15/22: My personal interpretation is sinus rhythm, 79 bpm, borderline ST elevation consistent with early repolarization, no significant change from prior ECG of 11/15/22.    Echo dated 2/16/22: My review of the report: Low normal left ventricular ejection fraction, EF 50%.  Mid septal to apical  apical hypokinesis.  Mild LVH.  No significant valvular stenosis or regurgitation.    Cardiac Catheterization dated 2/16/22:   Results are not available for review.  However per discharge summary, patient had a complete LAD occlusion with mild disease of the left circumflex and right coronary artery.  Successful stenting of the LAD and diagonal artery.      Assessment & Plan     1.  History of ST elevation anterior myocardial infarction 2/16/22.  Clinically doing well.  Episode where he presented to the emergency room 11/2022 where he ruled out and nuclear stress test was normal with normal LVEF.  Likely not cardiac.  Discussed continuation of medications and healthy lifestyle.  Continue secondary risk factor modification.  In terms of carrying the cell phone, I think for 1 more year to have a cell phone with him in case he had any symptoms is not unreasonable.  Just to him to not go back to smoking as it is a significant risk factors for recurrence of heart attacks strokes and lung issues.  - Continue aspirin 81 mg p.o. daily for life  - Continue Brilinta milligrams p.o. twice daily.  Discussed typically would stop in 1 year which would be 2/16/2023, however due to his young age and 2 stents of small enough size, I would like to continue it for 6 more months.  Patient would be agreeable.  -Letter was given to have him carry his cell phone while at work until 1/20/2024.  -We will obtain physicians cardiac cath report    2.  Overall normal LVEF by echo.  No signs of fluid overload.  For now would continue low-dose lisinopril and metoprolol succinate.    3.  Diabetes mellitus, recent diagnosis started on metformin defer to primary care provider for treatment.    4.  Hyperlipidemia.  Would continue with a atorvastatin with goal LDL of 70 or less.    5.  Obesity.  Discussed heart healthy eating and to try and lose some weight to get down closer to ideal body weight.    6.  He does snore quite a bit and he does stop  breathing in his sleep.  He tries not to sleep on his back.  - Referral to sleep medicine to assess for obstructive sleep apnea and treat.    Shared Medical Decision Making:  All of patient's questions were answered to the best of my knowledge and to patient's satisfaction. It was a pleasure seeing Mr. Isidro Crow in my clinic today. Return in about 6 months (around 7/20/2023). Patient is aware to call the cardiology clinic with any questions or concerns.    Beronica Saldana MD  [facility]  [address]  [place] [address]  Phone:  [phone]  Fax:  [phone]    Please note that this dictation was created using voice recognition software. I have made every reasonable attempt to correct obvious errors, but it is possible there are errors of grammar and possibly content that I did not discover before finalizing the note.

## 2023-01-20 NOTE — TELEPHONE ENCOUNTER
Per , records of 02/16/22 Heart cath physician report has been requested from [facility]. Fax confirmation has been received and sent to scan through [facility] ([facility]   Pending records.

## 2023-01-20 NOTE — PATIENT INSTRUCTIONS
Re:  Isidro Crow    To Whom It May Concern:    Mr. Crow was seen in cardiology on 1/20/2023.  Due to his cardiac condition, I would request you allow him to have his cell phone while working.  This is effective from 1/20/202321/20/2024.    Sincerely,  Beronica Saldana MD  Cardiology

## 2023-03-02 ENCOUNTER — TELEPHONE (OUTPATIENT)
Dept: CARDIOLOGY | Facility: MEDICAL CENTER | Age: 33
End: 2023-03-02
Payer: COMMERCIAL

## 2023-03-02 DIAGNOSIS — I25.84 CORONARY ARTERY DISEASE DUE TO CALCIFIED CORONARY LESION: ICD-10-CM

## 2023-03-02 DIAGNOSIS — I25.10 CORONARY ARTERY DISEASE DUE TO CALCIFIED CORONARY LESION: ICD-10-CM

## 2023-03-02 NOTE — TELEPHONE ENCOUNTER
Received paper RX requesting refill for Brilinta 90 MG tablets to be sent to [facility]. Per chart, medication has been discontinued. Pt last saw [facility] 1/20/23. Please advise, thank you!

## 2023-03-03 NOTE — TELEPHONE ENCOUNTER
Reviewed OV note from 1/20/23, CH noted plan for Brilinta as follows:   Continue Brilinta milligrams p.o. twice daily.  Discussed typically would stop in 1 year which would be 2/16/2023, however due to his young age and 2 stents of small enough size, I would like to continue it for 6 more months (around 7/20/23). Sent prescription as requested.

## 2023-03-10 ENCOUNTER — HOSPITAL ENCOUNTER (OUTPATIENT)
Facility: MEDICAL CENTER | Age: 33
End: 2023-03-10
Payer: COMMERCIAL

## 2023-03-10 ENCOUNTER — OFFICE VISIT (OUTPATIENT)
Dept: MEDICAL GROUP | Facility: PHYSICIAN GROUP | Age: 33
End: 2023-03-10
Payer: COMMERCIAL

## 2023-03-10 VITALS
TEMPERATURE: 97.7 F | SYSTOLIC BLOOD PRESSURE: 122 MMHG | HEIGHT: 68 IN | OXYGEN SATURATION: 97 % | HEART RATE: 86 BPM | WEIGHT: 216 LBS | DIASTOLIC BLOOD PRESSURE: 74 MMHG | BODY MASS INDEX: 32.74 KG/M2

## 2023-03-10 DIAGNOSIS — E11.59 TYPE 2 DIABETES MELLITUS WITH OTHER CIRCULATORY COMPLICATION, WITHOUT LONG-TERM CURRENT USE OF INSULIN (HCC): ICD-10-CM

## 2023-03-10 DIAGNOSIS — I10 PRIMARY HYPERTENSION: ICD-10-CM

## 2023-03-10 DIAGNOSIS — Z23 NEED FOR VACCINATION: ICD-10-CM

## 2023-03-10 DIAGNOSIS — I25.10 CORONARY ARTERY DISEASE DUE TO CALCIFIED CORONARY LESION: ICD-10-CM

## 2023-03-10 DIAGNOSIS — E78.1 HYPERTRIGLYCERIDEMIA: ICD-10-CM

## 2023-03-10 DIAGNOSIS — I25.84 CORONARY ARTERY DISEASE DUE TO CALCIFIED CORONARY LESION: ICD-10-CM

## 2023-03-10 DIAGNOSIS — G47.33 OBSTRUCTIVE SLEEP APNEA SYNDROME: ICD-10-CM

## 2023-03-10 LAB
CREAT UR-MCNC: 92.44 MG/DL
HBA1C MFR BLD: 7 % (ref ?–5.8)
MICROALBUMIN UR-MCNC: <1.2 MG/DL
MICROALBUMIN/CREAT UR: NORMAL MG/G (ref 0–30)
POCT INT CON NEG: NEGATIVE
POCT INT CON POS: POSITIVE

## 2023-03-10 PROCEDURE — 99214 OFFICE O/P EST MOD 30 MIN: CPT | Mod: 25

## 2023-03-10 PROCEDURE — 82570 ASSAY OF URINE CREATININE: CPT

## 2023-03-10 PROCEDURE — 83036 HEMOGLOBIN GLYCOSYLATED A1C: CPT

## 2023-03-10 PROCEDURE — 82043 UR ALBUMIN QUANTITATIVE: CPT

## 2023-03-10 PROCEDURE — 90471 IMMUNIZATION ADMIN: CPT

## 2023-03-10 PROCEDURE — 90746 HEPB VACCINE 3 DOSE ADULT IM: CPT

## 2023-03-10 RX ORDER — ATORVASTATIN CALCIUM 80 MG/1
80 TABLET, FILM COATED ORAL NIGHTLY
Qty: 90 TABLET | Refills: 3 | Status: SHIPPED | OUTPATIENT
Start: 2023-03-10 | End: 2023-12-15 | Stop reason: SDUPTHER

## 2023-03-10 RX ORDER — LISINOPRIL 5 MG/1
5 TABLET ORAL DAILY
Qty: 90 TABLET | Refills: 3 | Status: SHIPPED | OUTPATIENT
Start: 2023-03-10 | End: 2023-12-15 | Stop reason: SDUPTHER

## 2023-03-10 ASSESSMENT — PATIENT HEALTH QUESTIONNAIRE - PHQ9: CLINICAL INTERPRETATION OF PHQ2 SCORE: 0

## 2023-03-10 ASSESSMENT — FIBROSIS 4 INDEX: FIB4 SCORE: 0.31

## 2023-03-10 NOTE — ASSESSMENT & PLAN NOTE
Chronic, controlled.  -Continue current management with lisinopril 5 mg daily and metoprolol 25 mg daily

## 2023-03-10 NOTE — PROGRESS NOTES
"Subjective:     CC:   Chief Complaint   Patient presents with    Medication Refill       HISTORY OF THE PRESENT ILLNESS: Isidro is a pleasant 32 y.o. male here today to follow-up on the chronic conditions below as well as receive medication refills.    Problem   Primary Hypertension   Obstructive Sleep Apnea Syndrome    Does not use a CPAP machine.   He was supposed to do a sleep test but moved.      Type 2 Diabetes Mellitus With Circulatory Disorder, Without Long-Term Current Use of Insulin (Hcc)    Last A1C: 6.8  Started on metformin 500 mg twice daily at her last visit 3 months ago. He did run out of medication about 1 month ago and has not been taking anything during that time.  He is here today for a follow-up A1C.    Patient states he eats a lot of rice and he is trying to cut down.  He doesn't have much of a sweet tooth. He is trying to cut back on starch.          Health Maintenance: Completed    ROS:  All systems negative expect as addressed in assessment and plan.     Objective:     Exam:  /74 (BP Location: Right arm, Patient Position: Sitting, BP Cuff Size: Adult)   Pulse 86   Temp 36.5 °C (97.7 °F) (Temporal)   Ht 1.727 m (5' 8\")   Wt 98 kg (216 lb)   SpO2 97%   BMI 32.84 kg/m²  Body mass index is 32.84 kg/m².    Physical Exam  Constitutional:       Appearance: Normal appearance.   Cardiovascular:      Rate and Rhythm: Normal rate.   Pulmonary:      Effort: Pulmonary effort is normal.   Musculoskeletal:         General: Normal range of motion.   Neurological:      Mental Status: He is alert. Mental status is at baseline.   Psychiatric:         Mood and Affect: Mood normal.         Behavior: Behavior normal.       Labs: Results reviewed from 12/30/22    Assessment & Plan:     32 y.o. male with the following -    1. Type 2 diabetes mellitus with other circulatory complication, without long-term current use of insulin (HCC)  Chronic, stable.  A1C in clinic today is: 7  -Increase Metformin 1,000 mg " BID  - POCT  A1C  - metformin (GLUCOPHAGE) 1000 MG tablet; Take 1 Tablet by mouth 2 times a day with meals.  Dispense: 180 Tablet; Refill: 3  - Microalbumin Creat Ratio Urine (Clinic Collect); Future  - Diabetic Monofilament LE Exam    2. Primary hypertension  Chronic, controlled.  -Continue current management with lisinopril 5 mg daily and metoprolol 25 mg daily  - lisinopril (PRINIVIL) 5 MG Tab; Take 1 Tablet by mouth every day.  Dispense: 90 Tablet; Refill: 3  - metoprolol tartrate (LOPRESSOR) 25 MG Tab; Take 1 Tablet by mouth every day.  Dispense: 90 Tablet; Refill: 3    3. Obstructive sleep apnea syndrome  Chronic, not controlled.  Patient has appointment scheduled with sleep medicine from prior referral.    4. Coronary artery disease due to calcified coronary lesion  Chronic, stable.  Reviewed OV note from 1/20/23,  noted plan for Brilinta as follows:   Continue Brilinta milligrams p.o. twice daily. Discussed typically would stop in 1 year which would be 2/16/2023, however due to his young age and 2 stents of small enough size, I would like to continue it for 6 more months (around 7/20/23). Sent prescription as requested.  -Continue follow-up with cardiology  - ticagrelor (BRILINTA) 90 MG Tab tablet; Take 1 Tablet by mouth 2 times a day.  Dispense: 180 Tablet; Refill: 1    5. Hypertriglyceridemia  Chronic, ongoing.  Goal LDL <70  LP(a) is negative.    Patient's latest LDL on 12/30/2022 is stable at 62.  Continue current management with atorvastatin 80 mg nightly.  - atorvastatin (LIPITOR) 80 MG tablet; Take 1 Tablet by mouth every evening.  Dispense: 90 Tablet; Refill: 3    6. Need for vaccination  - Hepatitis B Vaccine Adult 20+    Patient was educated in proper administration of medication(s) ordered today including safety, possible SE, risks, benefits, rationale and alternatives to therapy.   Supportive care, differential diagnoses, and indications for immediate follow-up discussed with patient.     Pathogenesis of diagnosis discussed including typical length and natural progression.    Instructed to return to clinic or nearest emergency department for any change in condition, further concerns, or worsening of symptoms.  Patient states understanding of the plan of care and discharge instructions.    Return in about 3 months (around 6/10/2023) for A1C .    I spent a total of 31 minutes with record review, exam, and communication with the patient, communication with other providers, and documentation of this encounter. This does not include time spent on separately billable procedures/tests.    I have placed the above orders and discussed them with an approved delegating provider.  The MA is performing the below orders under the direction of Dr. Durant.    Please note that this dictation was created using voice recognition software. I have worked with consultants from the vendor as well as technical experts from [facility] to optimize the interface. I have made every reasonable attempt to correct obvious errors, but I expect that there are errors of grammar and possibly content that I did not discover before finalizing the note.

## 2023-06-02 ENCOUNTER — RESEARCH ENCOUNTER (OUTPATIENT)
Dept: RESEARCH | Facility: WORKSITE | Age: 33
End: 2023-06-02
Payer: COMMERCIAL

## 2023-06-02 DIAGNOSIS — Z00.6 RESEARCH STUDY PATIENT: ICD-10-CM

## 2023-06-16 ENCOUNTER — OFFICE VISIT (OUTPATIENT)
Dept: MEDICAL GROUP | Facility: PHYSICIAN GROUP | Age: 33
End: 2023-06-16
Payer: COMMERCIAL

## 2023-06-16 VITALS
RESPIRATION RATE: 20 BRPM | BODY MASS INDEX: 33.04 KG/M2 | HEIGHT: 68 IN | OXYGEN SATURATION: 95 % | DIASTOLIC BLOOD PRESSURE: 82 MMHG | SYSTOLIC BLOOD PRESSURE: 122 MMHG | WEIGHT: 218 LBS | TEMPERATURE: 97.5 F | HEART RATE: 75 BPM

## 2023-06-16 DIAGNOSIS — E11.59 TYPE 2 DIABETES MELLITUS WITH OTHER CIRCULATORY COMPLICATION, WITHOUT LONG-TERM CURRENT USE OF INSULIN (HCC): ICD-10-CM

## 2023-06-16 DIAGNOSIS — F17.211 CIGARETTE NICOTINE DEPENDENCE IN REMISSION: ICD-10-CM

## 2023-06-16 DIAGNOSIS — Z00.00 WELLNESS EXAMINATION: ICD-10-CM

## 2023-06-16 DIAGNOSIS — I10 PRIMARY HYPERTENSION: ICD-10-CM

## 2023-06-16 DIAGNOSIS — E78.1 HYPERTRIGLYCERIDEMIA: ICD-10-CM

## 2023-06-16 PROBLEM — K21.9 GERD (GASTROESOPHAGEAL REFLUX DISEASE): Status: ACTIVE | Noted: 2022-05-17

## 2023-06-16 PROBLEM — I25.119 ATHEROSCLEROTIC HEART DISEASE OF NATIVE CORONARY ARTERY WITH UNSPECIFIED ANGINA PECTORIS (HCC): Status: ACTIVE | Noted: 2022-02-23

## 2023-06-16 PROBLEM — E66.9 OBESITY: Status: ACTIVE | Noted: 2022-02-23

## 2023-06-16 PROBLEM — U07.1 DISEASE DUE TO SEVERE ACUTE RESPIRATORY SYNDROME CORONAVIRUS 2 (SARS-COV-2): Status: ACTIVE | Noted: 2022-05-19

## 2023-06-16 LAB
HBA1C MFR BLD: 6.7 % (ref ?–5.8)
POCT INT CON NEG: NEGATIVE
POCT INT CON POS: POSITIVE

## 2023-06-16 PROCEDURE — 3074F SYST BP LT 130 MM HG: CPT

## 2023-06-16 PROCEDURE — 3079F DIAST BP 80-89 MM HG: CPT

## 2023-06-16 PROCEDURE — 99214 OFFICE O/P EST MOD 30 MIN: CPT

## 2023-06-16 PROCEDURE — 83036 HEMOGLOBIN GLYCOSYLATED A1C: CPT

## 2023-06-16 RX ORDER — METOPROLOL SUCCINATE 25 MG/1
TABLET, EXTENDED RELEASE ORAL
COMMUNITY
Start: 2022-08-02 | End: 2023-06-16

## 2023-06-16 RX ORDER — DULAGLUTIDE 0.75 MG/.5ML
0.5 INJECTION, SOLUTION SUBCUTANEOUS
Qty: 6 ML | Refills: 1 | Status: SHIPPED | OUTPATIENT
Start: 2023-06-16 | End: 2023-11-22 | Stop reason: SDUPTHER

## 2023-06-16 RX ORDER — NICOTINE 21 MG/24HR
PATCH, TRANSDERMAL 24 HOURS TRANSDERMAL
COMMUNITY
Start: 2022-08-16 | End: 2023-06-21

## 2023-06-16 RX ORDER — NITROGLYCERIN 0.4 MG/1
TABLET SUBLINGUAL
COMMUNITY
Start: 2022-03-22 | End: 2024-03-21

## 2023-06-16 RX ORDER — ATORVASTATIN CALCIUM 80 MG/1
80 TABLET, FILM COATED ORAL
COMMUNITY
Start: 2022-08-02 | End: 2023-06-16

## 2023-06-16 ASSESSMENT — FIBROSIS 4 INDEX: FIB4 SCORE: 0.31

## 2023-06-16 NOTE — PROGRESS NOTES
"Subjective:     CC:   Chief Complaint   Patient presents with    Follow-Up       HISTORY OF THE PRESENT ILLNESS: Isidro is a pleasant 32 y.o. male here today to     Problem   Cigarette Nicotine Dependence in Remission    He quit after his MI with the support of a nicotine patch in February 2022.  He started smoking when he was 22 and smoked 4-5 cigarettes a day.  He denies cravings.     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  He wasn't tolerating and so he cut down to 500 mg once a day. He has been doing this for about one month ago.  Patient states he has been cutting back on his rice.  He doesn't have much of a sweet tooth. He is trying to cut back on starch.   He started a new job that is physically demanding and so he is moving the entire 8-hour shift.         Health Maintenance: Completed    ROS:  All systems negative expect as addressed in assessment and plan.     Objective:     Exam:  /82 (BP Location: Right arm, Patient Position: Sitting, BP Cuff Size: Adult)   Pulse 75   Temp 36.4 °C (97.5 °F) (Temporal)   Resp 20   Ht 1.727 m (5' 8\")   Wt 98.9 kg (218 lb)   SpO2 95%   BMI 33.15 kg/m²  Body mass index is 33.15 kg/m².    Physical Exam  Constitutional:       Appearance: Normal appearance.   Cardiovascular:      Rate and Rhythm: Normal rate.   Pulmonary:      Effort: Pulmonary effort is normal.   Neurological:      Mental Status: He is alert. Mental status is at baseline.   Psychiatric:         Mood and Affect: Mood normal.         Behavior: Behavior normal.         Labs: Results reviewed from 11/2022    Assessment & Plan:     32 y.o. " male with the following -    1. Type 2 diabetes mellitus with other circulatory complication, without long-term current use of insulin (HCC)  Chronic, controlled.  Goal A1C <7.  A1C 6.7 in clinic today.    Will start Trulicity 0.75 mg weekly.  Continue Metformin 500 mg twice daily until he receives his Trulicity. Then can discontinue Metformin as he does not tolerate this.  Will return in 3 months for A1C follow-up.  - POCT  A1C  - Dulaglutide (TRULICITY) 0.75 MG/0.5ML Solution Pen-injector; Inject 0.5 mL under the skin every 7 days.  Dispense: 6 mL; Refill: 1    2. Primary hypertension  Chronic, controlled.  BP in clinic is 122/82.  Managed in Lisinopril and Metoprolol.  - CBC WITH DIFFERENTIAL; Future    3. Hypertriglyceridemia  - Lipid Profile; Future    4. Wellness examination  - TSH WITH REFLEX TO FT4; Future  - VITAMIN D,25 HYDROXY (DEFICIENCY); Future  - Lipid Profile; Future  - Comp Metabolic Panel; Future  - CBC WITH DIFFERENTIAL; Future    5. Cigarette nicotine dependence in remission    Other orders  - nitroglycerin (NITROSTAT) 0.4 MG SL Tab; Dissolve 1 tablet under the tongue as needed for chest pain. May repeat 2 times every 5 minutes. Call 911 if pain persists longer than 5 minutes after the first dose. Continue to take the second and third doses if pain persists.  - nicotine (NICODERM) 14 MG/24HR PATCH 24 HR; Apply one patch to clean dry area of skin every 24 hours    Patient was educated in proper administration of medication(s) ordered today including safety, possible SE, risks, benefits, rationale and alternatives to therapy.   Supportive care, differential diagnoses, and indications for immediate follow-up discussed with patient.    Pathogenesis of diagnosis discussed including typical length and natural progression.    Instructed to return to clinic or nearest emergency department for any change in condition, further concerns, or worsening of symptoms.  Patient states understanding of the plan of  care and discharge instructions.    Return in about 3 months (around 9/16/2023) for A1C.    I have placed the above orders and discussed them with an approved delegating provider.  The MA is performing the below orders under the direction of Dr. Durant.    Please note that this dictation was created using voice recognition software. I have worked with consultants from the vendor as well as technical experts from [facility] to optimize the interface. I have made every reasonable attempt to correct obvious errors, but I expect that there are errors of grammar and possibly content that I did not discover before finalizing the note.

## 2023-06-16 NOTE — ASSESSMENT & PLAN NOTE
Chronic, controlled.  Goal A1C <7.  A1C 6.7 in clinic today.    Will start Trulicity 0.75 mg weekly.  Continue Metformin 500 mg twice daily until he receives his Trulicity. Then can discontinue Metformin as he does not tolerate this.  Will return in 3 months for A1C follow-up.

## 2023-06-21 ENCOUNTER — OFFICE VISIT (OUTPATIENT)
Dept: CARDIOLOGY | Facility: MEDICAL CENTER | Age: 33
End: 2023-06-21
Attending: INTERNAL MEDICINE
Payer: COMMERCIAL

## 2023-06-21 VITALS
WEIGHT: 213 LBS | HEIGHT: 68 IN | BODY MASS INDEX: 32.28 KG/M2 | HEART RATE: 80 BPM | RESPIRATION RATE: 16 BRPM | OXYGEN SATURATION: 98 % | DIASTOLIC BLOOD PRESSURE: 72 MMHG | SYSTOLIC BLOOD PRESSURE: 112 MMHG

## 2023-06-21 DIAGNOSIS — I25.10 CORONARY ARTERY DISEASE, OCCLUSIVE: ICD-10-CM

## 2023-06-21 DIAGNOSIS — I10 PRIMARY HYPERTENSION: ICD-10-CM

## 2023-06-21 DIAGNOSIS — Z95.5 S/P DRUG ELUTING CORONARY STENT PLACEMENT: ICD-10-CM

## 2023-06-21 DIAGNOSIS — E78.5 DYSLIPIDEMIA: ICD-10-CM

## 2023-06-21 LAB — EKG IMPRESSION: NORMAL

## 2023-06-21 PROCEDURE — 93010 ELECTROCARDIOGRAM REPORT: CPT | Performed by: INTERNAL MEDICINE

## 2023-06-21 PROCEDURE — 3078F DIAST BP <80 MM HG: CPT | Performed by: INTERNAL MEDICINE

## 2023-06-21 PROCEDURE — 99212 OFFICE O/P EST SF 10 MIN: CPT | Performed by: INTERNAL MEDICINE

## 2023-06-21 PROCEDURE — 3074F SYST BP LT 130 MM HG: CPT | Performed by: INTERNAL MEDICINE

## 2023-06-21 PROCEDURE — 99214 OFFICE O/P EST MOD 30 MIN: CPT | Mod: 25 | Performed by: INTERNAL MEDICINE

## 2023-06-21 PROCEDURE — 93005 ELECTROCARDIOGRAM TRACING: CPT | Performed by: INTERNAL MEDICINE

## 2023-06-21 ASSESSMENT — FIBROSIS 4 INDEX: FIB4 SCORE: 0.31

## 2023-06-21 ASSESSMENT — ENCOUNTER SYMPTOMS
PALPITATIONS: 0
DIZZINESS: 0
COUGH: 0
MYALGIAS: 0
LOSS OF CONSCIOUSNESS: 0
SHORTNESS OF BREATH: 0

## 2023-06-21 NOTE — PROGRESS NOTES
Chief Complaint   Patient presents with    Hypertension    Aortic Atherosclerosis     F/V Dx: Atherosclerotic heart disease of native coronary artery with unspecified angina pectoris (HCC)       Subjective     Isidro Crow is a 32 y.o. male who presents today for follow-up cardiac care.    The patient has a history of CAD, anterior MI, PCI LAD/D1 (2.75 x 26 mm CASPER, 2.5 x 26 mm CASPER respectively) 2/16/2022, DM, HLD, HTN.    Since 1/20/2023 appointment Beronica Saldana MD the patient has had no cardiac symptoms including chest pain, palpitations, shortness of breath.  He has transition from DAPT to aspirin in addition to his other cardiac medications.  He describes some erectile dysfunction problems.      Past Medical History:   Diagnosis Date    MI (myocardial infarction) (HCC)      History reviewed. No pertinent surgical history.  Family History   Problem Relation Age of Onset    Heart Disease Neg Hx      Social History     Socioeconomic History    Marital status:      Spouse name: Not on file    Number of children: Not on file    Years of education: Not on file    Highest education level: Not on file   Occupational History    Not on file   Tobacco Use    Smoking status: Former     Types: Cigarettes    Smokeless tobacco: Never   Vaping Use    Vaping Use: Former   Substance and Sexual Activity    Alcohol use: Yes     Alcohol/week: 0.6 oz     Types: 1 Cans of beer per week    Drug use: Never    Sexual activity: Not on file   Other Topics Concern    Not on file   Social History Narrative    Not on file     Social Determinants of Health     Financial Resource Strain: Not on file   Food Insecurity: Not on file   Transportation Needs: Not on file   Physical Activity: Not on file   Stress: Not on file   Social Connections: Not on file   Intimate Partner Violence: Not on file   Housing Stability: Not on file     No Known Allergies  Outpatient Encounter Medications as of 6/21/2023   Medication Sig Dispense  "Refill    nitroglycerin (NITROSTAT) 0.4 MG SL Tab Dissolve 1 tablet under the tongue as needed for chest pain. May repeat 2 times every 5 minutes. Call 911 if pain persists longer than 5 minutes after the first dose. Continue to take the second and third doses if pain persists.      ticagrelor (BRILINTA) 90 MG Tab tablet Take 1 Tablet by mouth 2 times a day. 180 Tablet 1    metformin (GLUCOPHAGE) 1000 MG tablet Take 1 Tablet by mouth 2 times a day with meals. 180 Tablet 3    lisinopril (PRINIVIL) 5 MG Tab Take 1 Tablet by mouth every day. 90 Tablet 3    atorvastatin (LIPITOR) 80 MG tablet Take 1 Tablet by mouth every evening. 90 Tablet 3    metoprolol tartrate (LOPRESSOR) 25 MG Tab Take 1 Tablet by mouth every day. 90 Tablet 3    omeprazole (PRILOSEC) 20 MG delayed-release capsule Take 1 Capsule by mouth every day.      aspirin EC (ECOTRIN) 81 MG Tablet Delayed Response Take 1 Tablet by mouth every day.      Dulaglutide (TRULICITY) 0.75 MG/0.5ML Solution Pen-injector Inject 0.5 mL under the skin every 7 days. 6 mL 1    [DISCONTINUED] nicotine (NICODERM) 14 MG/24HR PATCH 24 HR Apply one patch to clean dry area of skin every 24 hours (Patient not taking: Reported on 6/21/2023)       No facility-administered encounter medications on file as of 6/21/2023.     Review of Systems   Respiratory:  Negative for cough and shortness of breath.    Cardiovascular:  Negative for chest pain and palpitations.   Musculoskeletal:  Negative for myalgias.   Neurological:  Negative for dizziness and loss of consciousness.              Objective     /72 (BP Location: Left arm, Patient Position: Sitting, BP Cuff Size: Adult)   Pulse 80   Resp 16   Ht 1.727 m (5' 8\")   Wt 96.6 kg (213 lb)   SpO2 98%   BMI 32.39 kg/m²     Physical Exam  Vitals reviewed.   Constitutional:       General: He is not in acute distress.     Appearance: He is well-developed.   Eyes:      Conjunctiva/sclera: Conjunctivae normal.      Pupils: Pupils are " equal, round, and reactive to light.   Neck:      Vascular: No JVD.   Cardiovascular:      Rate and Rhythm: Normal rate and regular rhythm.      Pulses:           Radial pulses are 1+ on the right side and 1+ on the left side.      Heart sounds: Normal heart sounds. No murmur heard.     No friction rub. No gallop.   Pulmonary:      Effort: Pulmonary effort is normal. No accessory muscle usage or respiratory distress.      Breath sounds: Normal breath sounds. No wheezing or rales.   Abdominal:      General: There is no distension.      Palpations: Abdomen is soft. There is no mass.      Tenderness: There is no abdominal tenderness.   Musculoskeletal:      Cervical back: Normal range of motion and neck supple.      Right lower leg: No edema.      Left lower leg: No edema.   Skin:     General: Skin is warm and dry.      Findings: No rash.      Nails: There is no clubbing.   Neurological:      Mental Status: He is alert and oriented to person, place, and time.   Psychiatric:         Behavior: Behavior normal.               Cardiac Imaging and Procedures Review:    EKG dated 11/15/22: My personal interpretation is sinus rhythm, 79 bpm, borderline ST elevation consistent with early repolarization, no significant change from prior ECG of 11/15/22.     Echo dated 2/16/22: My review of the report: Low normal left ventricular ejection fraction, EF 50%.  Mid septal to apical apical hypokinesis.  Mild LVH.  No significant valvular stenosis or regurgitation.     Cardiac Catheterization dated 2/16/22:   Results are not available for review.  However per discharge summary, patient had a complete LAD occlusion with mild disease of the left circumflex and right coronary artery.  Successful stenting of the LAD and diagonal artery.     EKG 6/21/2023 sinus rhythm rate 61 personally interpreted    Assessment & Plan     1. Coronary artery disease, occlusive  EKG      2. Primary hypertension        3. Dyslipidemia        4. S/P drug  eluting coronary stent placement            Medical Decision Making: Today's Assessment/Status/Plan:   Regarding CAD, clinically stable, EKG normal, normal cardiovascular examination, continue aspirin, atorvastatin, metoprolol  Regarding hypertension, BP normal, at goal, continue lisinopril, BP goal less than 130/80  Regarding dyslipidemia, LDL 62, continue atorvastatin  RTC 4 months with Dr. Beronica Saldana

## 2023-06-29 LAB
APOB+LDLR+PCSK9 GENE MUT ANL BLD/T: NOT DETECTED
BRCA1+BRCA2 DEL+DUP + FULL MUT ANL BLD/T: NOT DETECTED
MLH1+MSH2+MSH6+PMS2 GN DEL+DUP+FUL M: NOT DETECTED

## 2023-07-03 ENCOUNTER — OFFICE VISIT (OUTPATIENT)
Dept: URGENT CARE | Facility: PHYSICIAN GROUP | Age: 33
End: 2023-07-03
Payer: COMMERCIAL

## 2023-07-03 VITALS
WEIGHT: 215 LBS | TEMPERATURE: 97.5 F | HEIGHT: 68 IN | DIASTOLIC BLOOD PRESSURE: 70 MMHG | RESPIRATION RATE: 16 BRPM | HEART RATE: 83 BPM | SYSTOLIC BLOOD PRESSURE: 104 MMHG | OXYGEN SATURATION: 95 % | BODY MASS INDEX: 32.58 KG/M2

## 2023-07-03 DIAGNOSIS — R12 HEART BURN: ICD-10-CM

## 2023-07-03 DIAGNOSIS — R10.9 STOMACH PAIN: ICD-10-CM

## 2023-07-03 DIAGNOSIS — R11.2 NAUSEA AND VOMITING, UNSPECIFIED VOMITING TYPE: ICD-10-CM

## 2023-07-03 PROCEDURE — 3078F DIAST BP <80 MM HG: CPT

## 2023-07-03 PROCEDURE — 3074F SYST BP LT 130 MM HG: CPT

## 2023-07-03 PROCEDURE — 99213 OFFICE O/P EST LOW 20 MIN: CPT

## 2023-07-03 ASSESSMENT — ENCOUNTER SYMPTOMS
EYES NEGATIVE: 1
CARDIOVASCULAR NEGATIVE: 1
FEVER: 0
CHILLS: 0
HEARTBURN: 1
DIARRHEA: 0
MYALGIAS: 0
WEAKNESS: 1
BLOOD IN STOOL: 0
COUGH: 0
SORE THROAT: 0
MUSCULOSKELETAL NEGATIVE: 1
FLANK PAIN: 0
SINUS PAIN: 0
VOMITING: 1
SHORTNESS OF BREATH: 0
WHEEZING: 0
ABDOMINAL PAIN: 1
PALPITATIONS: 0
NAUSEA: 1

## 2023-07-03 ASSESSMENT — FIBROSIS 4 INDEX: FIB4 SCORE: 0.31

## 2023-07-03 NOTE — LETTER
July 3, 2023         Patient: Isidro Crow   YOB: 1990   Date of Visit: 7/3/2023           To Whom it May Concern:    Isidro Crow was seen in my clinic on 7/3/2023. Please excuse any absences this week due to an acute illness. He may return to work sooner if he feels improved.     If you have any questions or concerns, please don't hesitate to call.        Sincerely,           MARIA VICTORIA Beatty.  Electronically Signed

## 2023-07-03 NOTE — PROGRESS NOTES
Subjective     Isidro Crow is a 32 y.o. male who presents with GI Problem (Stomach pain after eating. )          GI Problem  This is a new problem. Episode onset: felt it once two weeks ago, and again today. The problem occurs intermittently. The problem has been gradually improving. Associated symptoms include abdominal pain, congestion, nausea, vomiting and weakness. Pertinent negatives include no chest pain, chills, coughing, fever, myalgias or sore throat. The symptoms are aggravated by eating. The treatment provided no relief.   Both times he felt ill he was at [employer], had eaten the same noodle dish and shrimp salad.     Just started on Trulicity pen, stopped the metformin this week  Gallbladder removed in 2015.   No seasonal allergies     Review of Systems   Constitutional:  Positive for malaise/fatigue. Negative for chills and fever.   HENT:  Positive for congestion. Negative for ear discharge, ear pain, sinus pain and sore throat.         Does have sleep apnea-obstructive    Eyes: Negative.    Respiratory:  Negative for cough, shortness of breath and wheezing.    Cardiovascular: Negative.  Negative for chest pain, palpitations and leg swelling.        Did feel like his heart was racing when he first had the epigastric pain due to hx of MI   Gastrointestinal:  Positive for abdominal pain, heartburn, nausea and vomiting. Negative for blood in stool and diarrhea.   Genitourinary: Negative.  Negative for dysuria, flank pain, frequency, hematuria and urgency.   Musculoskeletal: Negative.  Negative for myalgias.   Skin: Negative.    Neurological:  Positive for weakness.        No Known Allergies    Current Outpatient Medications   Medication Sig    ticagrelor (BRILINTA) 90 MG Tab tablet Take 1 Tablet by mouth 2 times a day.    nitroglycerin (NITROSTAT) 0.4 MG SL Tab Dissolve 1 tablet under the tongue as needed for chest pain. May repeat 2 times every 5 minutes. Call 911 if pain persists  "longer than 5 minutes after the first dose. Continue to take the second and third doses if pain persists.    Dulaglutide (TRULICITY) 0.75 MG/0.5ML Solution Pen-injector Inject 0.5 mL under the skin every 7 days.    ticagrelor (BRILINTA) 90 MG Tab tablet Take 1 Tablet by mouth 2 times a day.    lisinopril (PRINIVIL) 5 MG Tab Take 1 Tablet by mouth every day.    atorvastatin (LIPITOR) 80 MG tablet Take 1 Tablet by mouth every evening.    metoprolol tartrate (LOPRESSOR) 25 MG Tab Take 1 Tablet by mouth every day.    omeprazole (PRILOSEC) 20 MG delayed-release capsule Take 1 Capsule by mouth every day.    aspirin EC (ECOTRIN) 81 MG Tablet Delayed Response Take 1 Tablet by mouth every day.    metformin (GLUCOPHAGE) 1000 MG tablet Take 1 Tablet by mouth 2 times a day with meals. (Patient not taking: Reported on 7/3/2023)        Past Medical History:   Diagnosis Date    MI (myocardial infarction) ([facility])           Objective     /70 (BP Location: Right arm, Patient Position: Sitting, BP Cuff Size: Adult)   Pulse 83   Temp 36.4 °C (97.5 °F) (Temporal)   Resp 16   Ht 1.727 m (5' 8\")   Wt 97.5 kg (215 lb)   SpO2 95%   BMI 32.69 kg/m²      Physical Exam  Vitals reviewed.   Constitutional:       Appearance: Normal appearance. He is not toxic-appearing.   HENT:      Head: Normocephalic and atraumatic.      Right Ear: Tympanic membrane and external ear normal.      Left Ear: Tympanic membrane and external ear normal.      Nose: Congestion present.      Mouth/Throat:      Mouth: Mucous membranes are moist.      Pharynx: Oropharynx is clear. Uvula midline.   Eyes:      Conjunctiva/sclera: Conjunctivae normal.   Cardiovascular:      Rate and Rhythm: Normal rate.   Pulmonary:      Effort: Pulmonary effort is normal. No respiratory distress.   Abdominal:      Tenderness: There is no abdominal tenderness. There is no right CVA tenderness, left CVA tenderness, guarding or rebound.   Musculoskeletal:         General: Normal " range of motion.      Cervical back: Normal range of motion.   Skin:     General: Skin is warm and dry.      Capillary Refill: Capillary refill takes less than 2 seconds.   Neurological:      Mental Status: He is alert and oriented to person, place, and time.           Assessment & Plan      Patient presents today after epigastric pain, nausea, vomiting after eating lunch.  Patient states this happened once before, he had eaten the same meal at the same restaurant 2 weeks ago.      Patient's physical exam is benign, no abdominal rebound, guarding, suprapubic tenderness, CVA tenderness.  Patient does have some mild congestion, states this is chronic.  Posterior pharynx clear, no erythema no exudates.  Uvula remains midline.  Lung sounds clear, no wheezing, no rhonchi, SPO2 95% in clinic today.     Patient states he does feel improved from earlier today.  Discussed differences in eating patterns after gallbladder removal can occur sporadically.  Patient states he does have omeprazole at home that he has been advised to take off-and-on.  Educated to stay hydrated, rest, eat mild foods and/or fluids as tolerated.     1. Heart burn        2. Stomach pain        3. Nausea and vomiting, unspecified vomiting type

## 2023-07-10 ENCOUNTER — OFFICE VISIT (OUTPATIENT)
Dept: MEDICAL GROUP | Facility: PHYSICIAN GROUP | Age: 33
End: 2023-07-10
Payer: COMMERCIAL

## 2023-07-10 VITALS
TEMPERATURE: 98 F | SYSTOLIC BLOOD PRESSURE: 118 MMHG | OXYGEN SATURATION: 92 % | BODY MASS INDEX: 31.83 KG/M2 | HEART RATE: 96 BPM | DIASTOLIC BLOOD PRESSURE: 76 MMHG | HEIGHT: 68 IN | WEIGHT: 210 LBS

## 2023-07-10 DIAGNOSIS — J02.9 ACUTE PHARYNGITIS, UNSPECIFIED ETIOLOGY: ICD-10-CM

## 2023-07-10 DIAGNOSIS — E11.59 TYPE 2 DIABETES MELLITUS WITH OTHER CIRCULATORY COMPLICATION, WITHOUT LONG-TERM CURRENT USE OF INSULIN (HCC): ICD-10-CM

## 2023-07-10 LAB — S PYO DNA SPEC NAA+PROBE: NOT DETECTED

## 2023-07-10 PROCEDURE — 3074F SYST BP LT 130 MM HG: CPT

## 2023-07-10 PROCEDURE — 3078F DIAST BP <80 MM HG: CPT

## 2023-07-10 PROCEDURE — 99214 OFFICE O/P EST MOD 30 MIN: CPT

## 2023-07-10 PROCEDURE — 87651 STREP A DNA AMP PROBE: CPT

## 2023-07-10 RX ORDER — METOPROLOL SUCCINATE 25 MG/1
1 TABLET, EXTENDED RELEASE ORAL
COMMUNITY
End: 2023-10-24

## 2023-07-10 RX ORDER — ATORVASTATIN CALCIUM 80 MG/1
1 TABLET, FILM COATED ORAL EVERY EVENING
COMMUNITY
End: 2023-10-24

## 2023-07-10 RX ORDER — AMOXICILLIN AND CLAVULANATE POTASSIUM 875; 125 MG/1; MG/1
1 TABLET, FILM COATED ORAL 2 TIMES DAILY
Qty: 14 TABLET | Refills: 0 | Status: SHIPPED | OUTPATIENT
Start: 2023-07-10 | End: 2023-08-02

## 2023-07-10 ASSESSMENT — FIBROSIS 4 INDEX: FIB4 SCORE: 0.31

## 2023-07-10 NOTE — PATIENT INSTRUCTIONS
Viral Symptom Relief for Adults  You have been diagnosed with a viral illness.  Antibiotics do not cure viral infections. [facility] is committed to treating your illness in the best way possible and that includes avoiding antibiotics when they are likely to do more harm than good. The treatments recommended below will help you feel better while your body’s own defenses are fighting the virus    General instructions:  Stay hydrated  Use a cool mist vaporizer to relieve congestion    Specific medications:   Cough   Suppressant: Dextromethorphan polistirex suspension 30 mg/5 mL (Delsym, Robitussin 12 Hr Cough)    Expectorant: Guaifenesin extended release 600 mg tablets  (Mucinex, Mucus Relief)       Headache; ear, throat, muscle, or joint pain; or fever   Alternate acetaminophen and ibuprofen every 4 hours      Sore throat   Menthol lozenges (Cepacol, Vicks VapoDrops)     Nasal congestion   Nasal Spray: Sodium chloride (saline) nasal spray (Ocean, Simply Saline)    By mouth: Pseudoephedrine 30 mg tablets (Sudafed)      Runny nose, itchy and watery eyes, and sneezing   Loratadine 10 mg tablets (Claritin)      Important Medication Instructions   Use medicines according to package instructions or as directed by your healthcare provider.    Stop the medications when symptoms improve.    Brand names are listed convenience; any brand may be utilized as long as it has the same active ingredient     Follow-up:   If not improved in 7 days, new symptoms occur, or you have other concerns please call or return for a recheck.

## 2023-07-10 NOTE — ASSESSMENT & PLAN NOTE
Pt reports he stopped metformin for gi upset/diarrhea with high doses of metformin.  Was told he could just take the Trulicity weekly.  Currently taking Trulicity once weekly injection with tolerable side effects.

## 2023-07-10 NOTE — PROGRESS NOTES
Chief Complaint   Patient presents with    Pharyngitis     X 3 days, neg covid test     Cough    Muscle Pain        HPI: Patient is a 32 y.o. male complaining of 7 days of illness including: nasal congestion, sore throat.   Mucus is: thick.  Similarly ill exposures: yes. Whole family.  Treatments tried: otc mouth wash/antiseptic   He  reports that he has quit smoking. His smoking use included cigarettes. He has never used smokeless tobacco..     ROS:  Denies cough, nausea, changes in bowel movements or skin rash. Complains of sore throat, congestion.     I reviewed the patient's medications, allergies and medical history:  Current Outpatient Medications   Medication Sig Dispense Refill    Omega-3 Fatty Acids (FISH OIL BURP-LESS PO)       ticagrelor (BRILINTA) 90 MG Tab tablet Take 1 Tablet by mouth 2 times a day.      nitroglycerin (NITROSTAT) 0.4 MG SL Tab Dissolve 1 tablet under the tongue as needed for chest pain. May repeat 2 times every 5 minutes. Call 911 if pain persists longer than 5 minutes after the first dose. Continue to take the second and third doses if pain persists.      Dulaglutide (TRULICITY) 0.75 MG/0.5ML Solution Pen-injector Inject 0.5 mL under the skin every 7 days. 6 mL 1    ticagrelor (BRILINTA) 90 MG Tab tablet Take 1 Tablet by mouth 2 times a day. 180 Tablet 1    metformin (GLUCOPHAGE) 1000 MG tablet Take 1 Tablet by mouth 2 times a day with meals. 180 Tablet 3    lisinopril (PRINIVIL) 5 MG Tab Take 1 Tablet by mouth every day. 90 Tablet 3    atorvastatin (LIPITOR) 80 MG tablet Take 1 Tablet by mouth every evening. 90 Tablet 3    metoprolol tartrate (LOPRESSOR) 25 MG Tab Take 1 Tablet by mouth every day. 90 Tablet 3    omeprazole (PRILOSEC) 20 MG delayed-release capsule Take 1 Capsule by mouth every day.      aspirin EC (ECOTRIN) 81 MG Tablet Delayed Response Take 1 Tablet by mouth every day.      atorvastatin (LIPITOR) 80 MG tablet Take 1 Tablet by mouth every evening.      metoprolol SR  "(TOPROL XL) 25 MG TABLET SR 24 HR Take 1 Tablet by mouth every day.       No current facility-administered medications for this visit.     Patient has no known allergies.  Past Medical History:   Diagnosis Date    MI (myocardial infarction) ([facility])         EXAM:  /76 (BP Location: Right arm, Patient Position: Sitting, BP Cuff Size: Small adult)   Pulse 96   Temp 36.7 °C (98 °F) (Temporal)   Ht 1.727 m (5' 8\")   Wt 95.3 kg (210 lb)   SpO2 92%   General: Alert, no conversational dyspnea or audible wheeze, non-toxic appearance.  Eyes: PERRL, conjunctiva slightly injected, no eye discharge.  Ears: Normal pinnae,TM's erythematous, bulging bilaterally.  Nares: Patent with clear mucus.  Sinuses: non tender over maxillary / frontal sinuses.  Throat: Erythematous injection without exudate. 4+ Tonsils bilaterally.   Neck: Supple, with mildly enlarged cervical nodes.  Lungs: Clear to auscultation bilaterally, no wheeze, crackles or rhonchi.   Heart: Regular rate without murmur.  Skin: Warm and dry without rash.     ASSESSMENT:   1. Type 2 diabetes mellitus with other circulatory complication, without long-term current use of insulin ([facility])    2. Acute pharyngitis, unspecified etiology         PLAN:  1. Educated patient that majority of upper respiratory infections are viral and do not need antibiotics. As symptoms have been worsening over the last week, will treat with antibiotics.  2. Twice daily use of nasal saline rinse or Neti-Pot.  3. OTC anti-pyretics and decongestants as needed.  4. Follow-up in office or urgent care for worsening symptoms, difficulty breathing, lack of expected recovery, or should new symptoms or problems arise.    "

## 2023-07-13 ENCOUNTER — OFFICE VISIT (OUTPATIENT)
Dept: MEDICAL GROUP | Facility: PHYSICIAN GROUP | Age: 33
End: 2023-07-13
Payer: COMMERCIAL

## 2023-07-13 VITALS
WEIGHT: 210 LBS | HEIGHT: 68 IN | DIASTOLIC BLOOD PRESSURE: 70 MMHG | OXYGEN SATURATION: 94 % | HEART RATE: 87 BPM | SYSTOLIC BLOOD PRESSURE: 104 MMHG | BODY MASS INDEX: 31.83 KG/M2 | TEMPERATURE: 98.1 F

## 2023-07-13 DIAGNOSIS — E11.59 TYPE 2 DIABETES MELLITUS WITH OTHER CIRCULATORY COMPLICATION, WITHOUT LONG-TERM CURRENT USE OF INSULIN (HCC): ICD-10-CM

## 2023-07-13 DIAGNOSIS — Z02.89 ENCOUNTER FOR COMPLETION OF FORM WITH PATIENT: ICD-10-CM

## 2023-07-13 DIAGNOSIS — E78.5 DYSLIPIDEMIA: ICD-10-CM

## 2023-07-13 PROCEDURE — 99214 OFFICE O/P EST MOD 30 MIN: CPT

## 2023-07-13 PROCEDURE — 3078F DIAST BP <80 MM HG: CPT

## 2023-07-13 PROCEDURE — 3074F SYST BP LT 130 MM HG: CPT

## 2023-07-13 ASSESSMENT — FIBROSIS 4 INDEX: FIB4 SCORE: 0.31

## 2023-07-13 NOTE — PROGRESS NOTES
"Subjective:     CC:   Chief Complaint   Patient presents with    Letter for School/Work     HISTORY OF THE PRESENT ILLNESS: Isidro is a pleasant 32 y.o. male here today to discuss obtaining a letter outlining his current health conditions and medications so he can file for accommodations at work.    Patient states he is having a hard time bending or lifting > 25 pounds due to chronic back pain as well as recently exacerbated muscle and joint pain.     Patient was is currently being treated for type 2 diabetes with Trulicity.  He was not tolerating metformin, so he was started on the weekly injection.  He states he started this 3 weeks ago and since in the last week, he has developed muscle aches, joint pain and lightheadedness.      We are currently managing his symptoms, however, patient states he is filing for light duty or he will be looking for a new position that is not as physically demanding.  His employer has requested a letter from his PCP so they can accommodate his request.    Health Maintenance: Completed    ROS:  All systems negative expect as addressed in assessment and plan.     Objective:     Exam:  /70 (BP Location: Right arm, Patient Position: Sitting, BP Cuff Size: Adult)   Pulse 87   Temp 36.7 °C (98.1 °F) (Temporal)   Ht 1.727 m (5' 8\")   Wt 95.3 kg (210 lb)   SpO2 94%   BMI 31.93 kg/m²  Body mass index is 31.93 kg/m².    Physical Exam  Constitutional:       Appearance: Normal appearance.   Cardiovascular:      Rate and Rhythm: Normal rate.   Pulmonary:      Effort: Pulmonary effort is normal.   Neurological:      Mental Status: He is alert. Mental status is at baseline.   Psychiatric:         Mood and Affect: Mood normal.         Behavior: Behavior normal.       Labs: Results reviewed from 06/16/23    Assessment & Plan:     32 y.o. male with the following -    1. Encounter for completion of form with patient  Note completed for patient outlining his health conditions, medications and " restrictions.    2. Type 2 diabetes mellitus with other circulatory complication, without long-term current use of insulin (HCC)  Chronic, ongoing.  Patient is being managed on Trulicity 0.75 milligrams weekly.  Patient reports recent muscle aches and lightheadedness.  Could be connected to recent the added medication.   Patient to continue with Trulicity weekly and will reassess side effects as well as blood sugar in 3 months.    3. Dyslipidemia  Chronic.   Patient is medication for a while.  A side effect and should be considered as possible cause.  To continue with atorvastatin 80 mg nightly due to risk associated with his cardiac/MI history.    Patient was educated in proper administration of medication(s) ordered today including safety, possible SE, risks, benefits, rationale and alternatives to therapy.   Supportive care, differential diagnoses, and indications for immediate follow-up discussed with patient.    Pathogenesis of diagnosis discussed including typical length and natural progression.    Instructed to return to clinic or nearest emergency department for any change in condition, further concerns, or worsening of symptoms.  Patient states understanding of the plan of care and discharge instructions.    Return in about 2 months (around 9/22/2023) for A1C and Hyperlipidemia.    I have placed the above orders and discussed them with an approved delegating provider.  The MA is performing the below orders under the direction of Dr. Montelongo.    Please note that this dictation was created using voice recognition software. I have worked with consultants from the vendor as well as technical experts from [facility] [employer] to optimize the interface. I have made every reasonable attempt to correct obvious errors, but I expect that there are errors of grammar and possibly content that I did not discover before finalizing the note.

## 2023-07-13 NOTE — LETTER
July 13, 2023    To Whom It May Concern:         This is confirmation that Isidro Crow attended his scheduled appointment with LAI Becerra on 7/13/23.  He is currently under my care for Type 2 diabetes as well as dyslipidemia.    Patient is being managed on medications for these conditions that could cause side effects including myalgias, lightheadedness and joint pain.    Due to his symptoms, he would benefit from abiding by physical restrictions including no bending or lifting heavy objects with a max weight restriction of 25 pounds.         If you have any questions please do not hesitate to call me at the phone number listed below.    Sincerely,          Juventino Aguilera A.P.R.N.  [phone]

## 2023-08-02 ENCOUNTER — TELEMEDICINE (OUTPATIENT)
Dept: MEDICAL GROUP | Facility: PHYSICIAN GROUP | Age: 33
End: 2023-08-02
Payer: COMMERCIAL

## 2023-08-02 VITALS — BODY MASS INDEX: 31.83 KG/M2 | HEART RATE: 80 BPM | WEIGHT: 210 LBS | HEIGHT: 68 IN

## 2023-08-02 DIAGNOSIS — U07.1 COVID-19: ICD-10-CM

## 2023-08-02 PROCEDURE — 99214 OFFICE O/P EST MOD 30 MIN: CPT | Mod: 95

## 2023-08-02 ASSESSMENT — FIBROSIS 4 INDEX: FIB4 SCORE: 0.31

## 2023-08-02 NOTE — PROGRESS NOTES
"Virtual Visit: Established Patient   This visit was conducted via Zoom using secure and encrypted videoconferencing technology.   The patient was in their home in the state of [place].    The patient's identity was confirmed and verbal consent was obtained for this virtual visit.    Subjective:     CC:   Chief Complaint   Patient presents with    Coronavirus Screening     Positive home covid test      HISTORY OF THE PRESENT ILLNESS: Isidro is a pleasant 32 y.o. male here today to discuss treatment for Covid.  Patient states his symptoms started yesterday with a stuffy nose, muscle aches and headache.  He states he did vomit last night.  Today, he has lost his taste and smell.  He denies fever but states he has had chills.    He does have a history of testing positive for COVID 2 years ago.  He is fully vaccinated with the booster.  Patient is considered high risk as he does have a history of MI with coronary artery disease.    Health Maintenance: Completed    ROS:  All systems negative expect as addressed in assessment and plan.     Objective:     Exam:  Pulse 80   Ht 1.727 m (5' 8\")   Wt 95.3 kg (210 lb)   BMI 31.93 kg/m²  Body mass index is 31.93 kg/m².    Physical Exam  Constitutional:       Appearance: Normal appearance.   Cardiovascular:      Rate and Rhythm: Normal rate.   Pulmonary:      Effort: Pulmonary effort is normal.   Neurological:      Mental Status: He is alert. Mental status is at baseline.   Psychiatric:         Mood and Affect: Mood normal.         Behavior: Behavior normal.       Assessment & Plan:     32 y.o. male with the following -    1. COVID-19  Patient is high-risk and a candidate for Paxlovid.  Labs verified. Medication interactions reviewed via [employer].  Patient advised to discontinue Atorvastatin as well as Brilinta during course of Paxlovid due to interactions. Patient verbalized understanding.  Paxlovid ordered in separate encounter.    Patient was educated in proper administration " of medication(s) ordered today including safety, possible SE, risks, benefits, rationale and alternatives to therapy.   Supportive care, differential diagnoses, and indications for immediate follow-up discussed with patient.    Pathogenesis of diagnosis discussed including typical length and natural progression.    Instructed to return to clinic or nearest emergency department for any change in condition, further concerns, or worsening of symptoms.  Patient states understanding of the plan of care and discharge instructions.    Return if symptoms worsen or fail to improve.    I have placed the above orders and discussed them with an approved delegating provider.  The MA is performing the below orders under the direction of Dr. Montelongo.    Please note that this dictation was created using voice recognition software. I have worked with consultants from the vendor as well as technical experts from [facility] [employer] to optimize the interface. I have made every reasonable attempt to correct obvious errors, but I expect that there are errors of grammar and possibly content that I did not discover before finalizing the note.

## 2023-08-03 ENCOUNTER — TELEPHONE (OUTPATIENT)
Dept: MEDICAL GROUP | Facility: PHYSICIAN GROUP | Age: 33
End: 2023-08-03
Payer: COMMERCIAL

## 2023-08-03 DIAGNOSIS — U07.1 COVID-19: ICD-10-CM

## 2023-09-22 ENCOUNTER — APPOINTMENT (OUTPATIENT)
Dept: MEDICAL GROUP | Facility: PHYSICIAN GROUP | Age: 33
End: 2023-09-22
Payer: COMMERCIAL

## 2023-10-11 ENCOUNTER — DOCUMENTATION (OUTPATIENT)
Dept: HEALTH INFORMATION MANAGEMENT | Facility: OTHER | Age: 33
End: 2023-10-11
Payer: COMMERCIAL

## 2023-10-23 SDOH — ECONOMIC STABILITY: INCOME INSECURITY: IN THE LAST 12 MONTHS, WAS THERE A TIME WHEN YOU WERE NOT ABLE TO PAY THE MORTGAGE OR RENT ON TIME?: NO

## 2023-10-23 SDOH — ECONOMIC STABILITY: TRANSPORTATION INSECURITY
IN THE PAST 12 MONTHS, HAS LACK OF TRANSPORTATION KEPT YOU FROM MEETINGS, WORK, OR FROM GETTING THINGS NEEDED FOR DAILY LIVING?: NO

## 2023-10-23 SDOH — ECONOMIC STABILITY: HOUSING INSECURITY: IN THE LAST 12 MONTHS, HOW MANY PLACES HAVE YOU LIVED?: 1

## 2023-10-23 SDOH — ECONOMIC STABILITY: HOUSING INSECURITY
IN THE LAST 12 MONTHS, WAS THERE A TIME WHEN YOU DID NOT HAVE A STEADY PLACE TO SLEEP OR SLEPT IN A SHELTER (INCLUDING NOW)?: NO

## 2023-10-23 SDOH — ECONOMIC STABILITY: FOOD INSECURITY: WITHIN THE PAST 12 MONTHS, THE FOOD YOU BOUGHT JUST DIDN'T LAST AND YOU DIDN'T HAVE MONEY TO GET MORE.: NEVER TRUE

## 2023-10-23 SDOH — HEALTH STABILITY: PHYSICAL HEALTH: ON AVERAGE, HOW MANY MINUTES DO YOU ENGAGE IN EXERCISE AT THIS LEVEL?: 60 MIN

## 2023-10-23 SDOH — ECONOMIC STABILITY: TRANSPORTATION INSECURITY
IN THE PAST 12 MONTHS, HAS THE LACK OF TRANSPORTATION KEPT YOU FROM MEDICAL APPOINTMENTS OR FROM GETTING MEDICATIONS?: NO

## 2023-10-23 SDOH — ECONOMIC STABILITY: INCOME INSECURITY: HOW HARD IS IT FOR YOU TO PAY FOR THE VERY BASICS LIKE FOOD, HOUSING, MEDICAL CARE, AND HEATING?: NOT VERY HARD

## 2023-10-23 SDOH — ECONOMIC STABILITY: FOOD INSECURITY: WITHIN THE PAST 12 MONTHS, YOU WORRIED THAT YOUR FOOD WOULD RUN OUT BEFORE YOU GOT MONEY TO BUY MORE.: NEVER TRUE

## 2023-10-23 SDOH — ECONOMIC STABILITY: TRANSPORTATION INSECURITY
IN THE PAST 12 MONTHS, HAS LACK OF RELIABLE TRANSPORTATION KEPT YOU FROM MEDICAL APPOINTMENTS, MEETINGS, WORK OR FROM GETTING THINGS NEEDED FOR DAILY LIVING?: NO

## 2023-10-23 SDOH — HEALTH STABILITY: PHYSICAL HEALTH: ON AVERAGE, HOW MANY DAYS PER WEEK DO YOU ENGAGE IN MODERATE TO STRENUOUS EXERCISE (LIKE A BRISK WALK)?: 4 DAYS

## 2023-10-23 SDOH — HEALTH STABILITY: MENTAL HEALTH
STRESS IS WHEN SOMEONE FEELS TENSE, NERVOUS, ANXIOUS, OR CAN'T SLEEP AT NIGHT BECAUSE THEIR MIND IS TROUBLED. HOW STRESSED ARE YOU?: NOT AT ALL

## 2023-10-23 ASSESSMENT — SOCIAL DETERMINANTS OF HEALTH (SDOH)
DO YOU BELONG TO ANY CLUBS OR ORGANIZATIONS SUCH AS CHURCH GROUPS UNIONS, FRATERNAL OR ATHLETIC GROUPS, OR SCHOOL GROUPS?: NO
IN A TYPICAL WEEK, HOW MANY TIMES DO YOU TALK ON THE PHONE WITH FAMILY, FRIENDS, OR NEIGHBORS?: MORE THAN THREE TIMES A WEEK
HOW OFTEN DO YOU GET TOGETHER WITH FRIENDS OR RELATIVES?: MORE THAN THREE TIMES A WEEK
HOW OFTEN DO YOU ATTEND CHURCH OR RELIGIOUS SERVICES?: MORE THAN 4 TIMES PER YEAR
IN A TYPICAL WEEK, HOW MANY TIMES DO YOU TALK ON THE PHONE WITH FAMILY, FRIENDS, OR NEIGHBORS?: MORE THAN THREE TIMES A WEEK
WITHIN THE PAST 12 MONTHS, YOU WORRIED THAT YOUR FOOD WOULD RUN OUT BEFORE YOU GOT THE MONEY TO BUY MORE: NEVER TRUE
HOW OFTEN DO YOU GET TOGETHER WITH FRIENDS OR RELATIVES?: MORE THAN THREE TIMES A WEEK
HOW OFTEN DO YOU HAVE SIX OR MORE DRINKS ON ONE OCCASION: NEVER
HOW MANY DRINKS CONTAINING ALCOHOL DO YOU HAVE ON A TYPICAL DAY WHEN YOU ARE DRINKING: 1 OR 2
HOW OFTEN DO YOU ATTENT MEETINGS OF THE CLUB OR ORGANIZATION YOU BELONG TO?: NEVER
HOW OFTEN DO YOU HAVE A DRINK CONTAINING ALCOHOL: MONTHLY OR LESS
HOW OFTEN DO YOU ATTENT MEETINGS OF THE CLUB OR ORGANIZATION YOU BELONG TO?: NEVER
HOW OFTEN DO YOU ATTEND CHURCH OR RELIGIOUS SERVICES?: MORE THAN 4 TIMES PER YEAR
DO YOU BELONG TO ANY CLUBS OR ORGANIZATIONS SUCH AS CHURCH GROUPS UNIONS, FRATERNAL OR ATHLETIC GROUPS, OR SCHOOL GROUPS?: NO
HOW HARD IS IT FOR YOU TO PAY FOR THE VERY BASICS LIKE FOOD, HOUSING, MEDICAL CARE, AND HEATING?: NOT VERY HARD

## 2023-10-23 ASSESSMENT — LIFESTYLE VARIABLES
SKIP TO QUESTIONS 9-10: 1
AUDIT-C TOTAL SCORE: 1
HOW OFTEN DO YOU HAVE SIX OR MORE DRINKS ON ONE OCCASION: NEVER
HOW MANY STANDARD DRINKS CONTAINING ALCOHOL DO YOU HAVE ON A TYPICAL DAY: 1 OR 2
HOW OFTEN DO YOU HAVE A DRINK CONTAINING ALCOHOL: MONTHLY OR LESS

## 2023-10-24 ENCOUNTER — OFFICE VISIT (OUTPATIENT)
Dept: MEDICAL GROUP | Facility: PHYSICIAN GROUP | Age: 33
End: 2023-10-24
Payer: COMMERCIAL

## 2023-10-24 VITALS
TEMPERATURE: 98.4 F | WEIGHT: 206 LBS | DIASTOLIC BLOOD PRESSURE: 64 MMHG | SYSTOLIC BLOOD PRESSURE: 108 MMHG | HEART RATE: 93 BPM | RESPIRATION RATE: 16 BRPM | HEIGHT: 68 IN | BODY MASS INDEX: 31.22 KG/M2 | OXYGEN SATURATION: 96 %

## 2023-10-24 DIAGNOSIS — N52.1 ERECTILE DYSFUNCTION DUE TO DISEASES CLASSIFIED ELSEWHERE: ICD-10-CM

## 2023-10-24 DIAGNOSIS — Z23 NEED FOR VACCINATION: ICD-10-CM

## 2023-10-24 DIAGNOSIS — Z95.5 S/P DRUG ELUTING CORONARY STENT PLACEMENT: ICD-10-CM

## 2023-10-24 DIAGNOSIS — Z76.89 ENCOUNTER TO ESTABLISH CARE: ICD-10-CM

## 2023-10-24 DIAGNOSIS — K21.9 GASTROESOPHAGEAL REFLUX DISEASE, UNSPECIFIED WHETHER ESOPHAGITIS PRESENT: ICD-10-CM

## 2023-10-24 DIAGNOSIS — E78.5 HYPERLIPIDEMIA ASSOCIATED WITH TYPE 2 DIABETES MELLITUS (HCC): ICD-10-CM

## 2023-10-24 DIAGNOSIS — E11.69 HYPERLIPIDEMIA ASSOCIATED WITH TYPE 2 DIABETES MELLITUS (HCC): ICD-10-CM

## 2023-10-24 DIAGNOSIS — E66.09 CLASS 1 OBESITY DUE TO EXCESS CALORIES WITH SERIOUS COMORBIDITY AND BODY MASS INDEX (BMI) OF 31.0 TO 31.9 IN ADULT: ICD-10-CM

## 2023-10-24 DIAGNOSIS — E11.59 TYPE 2 DIABETES MELLITUS WITH OTHER CIRCULATORY COMPLICATION, WITHOUT LONG-TERM CURRENT USE OF INSULIN (HCC): ICD-10-CM

## 2023-10-24 DIAGNOSIS — I15.2 HYPERTENSION ASSOCIATED WITH TYPE 2 DIABETES MELLITUS (HCC): ICD-10-CM

## 2023-10-24 DIAGNOSIS — E11.59 HYPERTENSION ASSOCIATED WITH TYPE 2 DIABETES MELLITUS (HCC): ICD-10-CM

## 2023-10-24 DIAGNOSIS — G47.33 OBSTRUCTIVE SLEEP APNEA SYNDROME: ICD-10-CM

## 2023-10-24 PROBLEM — E66.811 CLASS 1 OBESITY DUE TO EXCESS CALORIES WITH SERIOUS COMORBIDITY AND BODY MASS INDEX (BMI) OF 31.0 TO 31.9 IN ADULT: Status: ACTIVE | Noted: 2022-02-23

## 2023-10-24 PROBLEM — U07.1 DISEASE DUE TO SEVERE ACUTE RESPIRATORY SYNDROME CORONAVIRUS 2 (SARS-COV-2): Status: RESOLVED | Noted: 2022-05-19 | Resolved: 2023-10-24

## 2023-10-24 PROBLEM — F17.211 CIGARETTE NICOTINE DEPENDENCE IN REMISSION: Status: RESOLVED | Noted: 2023-06-16 | Resolved: 2023-10-24

## 2023-10-24 PROBLEM — R07.9 CHEST PAIN: Status: RESOLVED | Noted: 2022-11-15 | Resolved: 2023-10-24

## 2023-10-24 LAB
HBA1C MFR BLD: 6 % (ref ?–5.8)
POCT INT CON NEG: NEGATIVE
POCT INT CON POS: POSITIVE

## 2023-10-24 PROCEDURE — 3078F DIAST BP <80 MM HG: CPT

## 2023-10-24 PROCEDURE — 83036 HEMOGLOBIN GLYCOSYLATED A1C: CPT

## 2023-10-24 PROCEDURE — 90686 IIV4 VACC NO PRSV 0.5 ML IM: CPT

## 2023-10-24 PROCEDURE — 99214 OFFICE O/P EST MOD 30 MIN: CPT | Mod: 25

## 2023-10-24 PROCEDURE — 90472 IMMUNIZATION ADMIN EACH ADD: CPT

## 2023-10-24 PROCEDURE — 90471 IMMUNIZATION ADMIN: CPT

## 2023-10-24 PROCEDURE — 3074F SYST BP LT 130 MM HG: CPT

## 2023-10-24 PROCEDURE — 90746 HEPB VACCINE 3 DOSE ADULT IM: CPT

## 2023-10-24 PROCEDURE — 92250 FUNDUS PHOTOGRAPHY W/I&R: CPT | Mod: TC

## 2023-10-24 RX ORDER — SILDENAFIL 50 MG/1
50 TABLET, FILM COATED ORAL
Qty: 10 TABLET | Refills: 3 | Status: SHIPPED | OUTPATIENT
Start: 2023-10-24 | End: 2023-12-07 | Stop reason: SDUPTHER

## 2023-10-24 ASSESSMENT — FIBROSIS 4 INDEX: FIB4 SCORE: 0.32

## 2023-10-24 NOTE — ASSESSMENT & PLAN NOTE
Chronic, stable.  Blood pressure in clinic today 108/64.  Continue lisinopril 5 mg daily, metoprolol 25 mg daily.   good balance

## 2023-10-24 NOTE — ASSESSMENT & PLAN NOTE
Chronic, improving. 6.0% a1c today. Currently taking Trulicity 0.75 mg every 7 days, tolerating this medication well.  POCT A1c, normal exam today.  Continue trulicity 0.75 mg weekly

## 2023-10-24 NOTE — ASSESSMENT & PLAN NOTE
Chronic, stable.  Continue atorvastatin 80 mg nightly, aspirin 81 mg daily.  The ASCVD Risk score (Darron CORDOVA, et al., 2019) failed to calculate.

## 2023-10-24 NOTE — ASSESSMENT & PLAN NOTE
Chronic, improving. Working on improving nutrition and getting blood glucose under control.   Continue healthy lifestyle recommendations.

## 2023-10-24 NOTE — ASSESSMENT & PLAN NOTE
Chronic, managed by cardiology.  Continue healthy lifestyle recommendations, medications as prescribed follow-up with cardiology as scheduled

## 2023-10-25 LAB — RETINAL SCREEN: NEGATIVE

## 2023-10-25 NOTE — PROGRESS NOTES
Subjective:     CC: Diagnoses of Need for vaccination, Type 2 diabetes mellitus with other circulatory complication, without long-term current use of insulin (HCC), Hypertension associated with type 2 diabetes mellitus (HCC), Hyperlipidemia associated with type 2 diabetes mellitus (HCC), Obstructive sleep apnea syndrome, Gastroesophageal reflux disease, unspecified whether esophagitis present, S/P drug eluting coronary stent placement, Encounter to establish care, Erectile dysfunction due to diseases classified elsewhere, and Class 1 obesity due to excess calories with serious comorbidity and body mass index (BMI) of 31.0 to 31.9 in adult were pertinent to this visit.    Pt presents today to establish care with me. Prior pcp Juventino holm.   He is doing well today with no complaints, accompanied by his son    , 1 child. Starting new job with [employer].    HPI:   Isidro presents today with    Problem   Erectile Dysfunction Due to Diseases Classified Elsewhere   S/P Drug Eluting Coronary Stent Placement   Hypertension Associated With Type 2 Diabetes Mellitus (Hcc)   Hyperlipidemia Associated With Type 2 Diabetes Mellitus (Hcc)   Obstructive Sleep Apnea Syndrome    Does not use a CPAP machine.   He was supposed to do a sleep test but moved.      Gerd (Gastroesophageal Reflux Disease)   Class 1 Obesity Due to Excess Calories With Serious Comorbidity and Body Mass Index (Bmi) of 31.0 to 31.9 in Adult   Type 2 Diabetes Mellitus With Circulatory Disorder, Without Long-Term Current Use of Insulin (Hcc)    This is a chronic condition.  Current medications:  Insulin:   Biguanide: discontinued- ongoing dizziness/stomach pain  GLP1-RA:  trulicity 0.75 mg every 7 days  SGLT-2i:      DPP4-I:   TZD:   Aleks:  Sulfonyluria:     Last A1c: 6.0 % 10/24/23; 6.7% 6/17/23  Last Microalb/Cr ratio: 3/10/23  Fasting sugars:  Last diabetic foot exam: 3/10/23  Last retinal eye exam: 10/24/23  ACEi/ARB?  Lisinopril 5 mg daily  Statin?   "Atorvastatin 80 mg daily  Aspirin?  81 mg daily  Concomitant HTN?  Metoprolol 25 mg daily  Nightly foot checks? encouraged       Dyslipidemia (Resolved)   Cigarette Nicotine Dependence in Remission (Resolved)    He quit after his MI with the support of a nicotine patch in February 2022.  He started smoking when he was 22 and smoked 4-5 cigarettes a day.  He denies cravings.     Chest Pain (Resolved)   Disease Due to Severe Acute Respiratory Syndrome Coronavirus 2 (Sars-Cov-2) (Resolved)     ROS:  Review of Systems   All other systems reviewed and are negative.      Objective:     Exam:  /64 (BP Location: Right arm, Patient Position: Sitting, BP Cuff Size: Small adult)   Pulse 93   Temp 36.9 °C (98.4 °F) (Temporal)   Resp 16   Ht 1.727 m (5' 8\")   Wt 93.4 kg (206 lb)   SpO2 96%   BMI 31.32 kg/m²  Body mass index is 31.32 kg/m².    Physical Exam  Vitals reviewed.   Constitutional:       General: He is not in acute distress.     Appearance: Normal appearance. He is not ill-appearing.   HENT:      Head: Normocephalic and atraumatic.   Cardiovascular:      Rate and Rhythm: Normal rate.      Pulses: Normal pulses.   Pulmonary:      Effort: Pulmonary effort is normal. No respiratory distress.   Skin:     General: Skin is warm and dry.      Findings: No rash.   Neurological:      General: No focal deficit present.      Mental Status: He is alert and oriented to person, place, and time.   Psychiatric:         Mood and Affect: Mood normal.         Behavior: Behavior normal.       Assessment & Plan:     33 y.o. male with the following -     Problem List Items Addressed This Visit       Type 2 diabetes mellitus with circulatory disorder, without long-term current use of insulin (HCC)     Chronic, improving. 6.0% a1c today. Currently taking Trulicity 0.75 mg every 7 days, tolerating this medication well.  POCT A1c, normal exam today.  Continue trulicity 0.75 mg weekly         Relevant Medications    sildenafil " citrate (VIAGRA) 50 MG tablet    Other Relevant Orders    POCT Retinal Eye Exam    POCT  A1C (Completed)    Class 1 obesity due to excess calories with serious comorbidity and body mass index (BMI) of 31.0 to 31.9 in adult     Chronic, improving. Working on improving nutrition and getting blood glucose under control.   Continue healthy lifestyle recommendations.          Relevant Orders    Patient identified as having weight management issue.  Appropriate orders and counseling given.    Hypertension associated with type 2 diabetes mellitus (HCC)     Chronic, stable.  Blood pressure in clinic today 108/64.  Continue lisinopril 5 mg daily, metoprolol 25 mg daily.         Relevant Medications    sildenafil citrate (VIAGRA) 50 MG tablet    Hyperlipidemia associated with type 2 diabetes mellitus (HCC)     Chronic, stable.  Continue atorvastatin 80 mg nightly, aspirin 81 mg daily.  The ASCVD Risk score (Darron CORDOVA, et al., 2019) failed to calculate.         Relevant Medications    sildenafil citrate (VIAGRA) 50 MG tablet    Obstructive sleep apnea syndrome     Chronic, stable.  Wearing CPAP nightly         GERD (gastroesophageal reflux disease)     Chronic, stable.  Managed with omeprazole 20 mg daily, Tums as needed         S/P drug eluting coronary stent placement     Chronic, managed by cardiology.  Continue healthy lifestyle recommendations, medications as prescribed follow-up with cardiology as scheduled         Erectile dysfunction due to diseases classified elsewhere    Relevant Medications    sildenafil citrate (VIAGRA) 50 MG tablet     Other Visit Diagnoses       Need for vaccination        Relevant Orders    INFLUENZA VACCINE QUAD INJ (PF) (Completed)    Hepatitis B Vaccine Adult IM (Completed)    Encounter to establish care              Patient was educated in proper administration of medication(s) ordered today including safety, possible SE, risks, benefits, rationale and alternatives to therapy.   Supportive  care, differential diagnoses, and indications for immediate follow-up discussed with patient.    Pathogenesis of diagnosis discussed including typical length and natural progression.    Instructed to return to clinic or nearest emergency department for any change in condition, further concerns, or worsening of symptoms.  Patient states understanding of the plan of care and discharge instructions.    Return in about 3 months (around 1/24/2024) for Diabetes, A1C.    Please note that this dictation was created using voice recognition software. I have made every reasonable attempt to correct obvious errors, but I expect that there are errors of grammar and possibly content that I did not discover before finalizing the note.

## 2023-11-22 ENCOUNTER — PATIENT MESSAGE (OUTPATIENT)
Dept: MEDICAL GROUP | Facility: PHYSICIAN GROUP | Age: 33
End: 2023-11-22
Payer: COMMERCIAL

## 2023-11-22 DIAGNOSIS — E11.59 TYPE 2 DIABETES MELLITUS WITH OTHER CIRCULATORY COMPLICATION, WITHOUT LONG-TERM CURRENT USE OF INSULIN (HCC): ICD-10-CM

## 2023-11-22 RX ORDER — DULAGLUTIDE 0.75 MG/.5ML
0.5 INJECTION, SOLUTION SUBCUTANEOUS
Qty: 6 ML | Refills: 1 | Status: SHIPPED | OUTPATIENT
Start: 2023-11-22 | End: 2023-12-07 | Stop reason: SDUPTHER

## 2023-12-07 ENCOUNTER — OFFICE VISIT (OUTPATIENT)
Dept: CARDIOLOGY | Facility: MEDICAL CENTER | Age: 33
End: 2023-12-07
Attending: INTERNAL MEDICINE
Payer: COMMERCIAL

## 2023-12-07 VITALS
OXYGEN SATURATION: 97 % | SYSTOLIC BLOOD PRESSURE: 118 MMHG | BODY MASS INDEX: 31.67 KG/M2 | HEIGHT: 68 IN | HEART RATE: 106 BPM | DIASTOLIC BLOOD PRESSURE: 76 MMHG | WEIGHT: 209 LBS

## 2023-12-07 DIAGNOSIS — E11.69 HYPERLIPIDEMIA ASSOCIATED WITH TYPE 2 DIABETES MELLITUS (HCC): ICD-10-CM

## 2023-12-07 DIAGNOSIS — N52.1 ERECTILE DYSFUNCTION DUE TO DISEASES CLASSIFIED ELSEWHERE: ICD-10-CM

## 2023-12-07 DIAGNOSIS — E78.5 DYSLIPIDEMIA: ICD-10-CM

## 2023-12-07 DIAGNOSIS — E78.5 HYPERLIPIDEMIA ASSOCIATED WITH TYPE 2 DIABETES MELLITUS (HCC): ICD-10-CM

## 2023-12-07 DIAGNOSIS — E11.59 HYPERTENSION ASSOCIATED WITH TYPE 2 DIABETES MELLITUS (HCC): ICD-10-CM

## 2023-12-07 DIAGNOSIS — E11.59 TYPE 2 DIABETES MELLITUS WITH OTHER CIRCULATORY COMPLICATION, WITHOUT LONG-TERM CURRENT USE OF INSULIN (HCC): ICD-10-CM

## 2023-12-07 DIAGNOSIS — Z95.5 S/P DRUG ELUTING CORONARY STENT PLACEMENT: ICD-10-CM

## 2023-12-07 DIAGNOSIS — I25.10 CORONARY ARTERY DISEASE, OCCLUSIVE: ICD-10-CM

## 2023-12-07 DIAGNOSIS — I15.2 HYPERTENSION ASSOCIATED WITH TYPE 2 DIABETES MELLITUS (HCC): ICD-10-CM

## 2023-12-07 PROCEDURE — 99214 OFFICE O/P EST MOD 30 MIN: CPT | Performed by: INTERNAL MEDICINE

## 2023-12-07 PROCEDURE — 3078F DIAST BP <80 MM HG: CPT | Performed by: INTERNAL MEDICINE

## 2023-12-07 PROCEDURE — 3074F SYST BP LT 130 MM HG: CPT | Performed by: INTERNAL MEDICINE

## 2023-12-07 PROCEDURE — 99212 OFFICE O/P EST SF 10 MIN: CPT | Performed by: INTERNAL MEDICINE

## 2023-12-07 ASSESSMENT — ENCOUNTER SYMPTOMS
LOSS OF CONSCIOUSNESS: 0
MYALGIAS: 0
DIZZINESS: 0
PALPITATIONS: 0
SHORTNESS OF BREATH: 0
COUGH: 0

## 2023-12-07 ASSESSMENT — FIBROSIS 4 INDEX: FIB4 SCORE: 0.32

## 2023-12-07 NOTE — PROGRESS NOTES
Chief Complaint   Patient presents with    Coronary Artery Disease    Hyperlipidemia    Hypertension       Subjective     Isidro Crow is a 33 y.o. male who presents today for follow-up cardiac care.    The patient has a history of CAD, anterior MI, PCI LAD/D1 (2.75 x 26 mm CASPER, 2.5 x 26 mm CASPER respectively) 2022, DM, HLD, HTN.    Since 2023 appointment the patient has had no cardiac symptoms including chest pain, palpitations, shortness of breath.  Unfortunately Trulicity was not renewed due to insurance issues that are yet to be rectified.    Past Medical History:   Diagnosis Date    Chest pain 11/15/2022    Cigarette nicotine dependence in remission 2023    He quit after his MI with the support of a nicotine patch in 2022. He started smoking when he was 22 and smoked 4-5 cigarettes a day. He denies cravings.    Disease due to severe acute respiratory syndrome coronavirus 2 (SARS-CoV-2) 2022    Dyslipidemia 2023    MI (myocardial infarction) (HCC)      Past Surgical History:   Procedure Laterality Date    CHOLECYSTECTOMY      STENT PLACEMENT      drug eluding stent placement.     Family History   Problem Relation Age of Onset    No Known Problems Mother     No Known Problems Sister     No Known Problems Brother     Diabetes Maternal Grandmother     Hyperlipidemia Paternal Grandmother     Hypertension Paternal Grandmother     Heart Disease Paternal Grandmother     Diabetes Paternal Grandmother     Cancer Neg Hx     Stroke Neg Hx      Social History     Socioeconomic History    Marital status:      Spouse name: Not on file    Number of children: Not on file    Years of education: Not on file    Highest education level: Some college, no degree   Occupational History    Not on file   Tobacco Use    Smoking status: Former     Current packs/day: 0.00     Types: Cigarettes, Electronic Cigarettes     Quit date: 2/15/2022     Years since quittin.8    Smokeless  tobacco: Never   Vaping Use    Vaping Use: Former   Substance and Sexual Activity    Alcohol use: Yes     Alcohol/week: 0.6 oz     Types: 1 Cans of beer per week     Comment: Occ    Drug use: Never    Sexual activity: Yes     Partners: Female     Birth control/protection: I.U.D.   Other Topics Concern    Not on file   Social History Narrative    Not on file     Social Determinants of Health     Financial Resource Strain: Low Risk  (10/23/2023)    Overall Financial Resource Strain (CARDIA)     Difficulty of Paying Living Expenses: Not very hard   Food Insecurity: No Food Insecurity (10/23/2023)    Hunger Vital Sign     Worried About Running Out of Food in the Last Year: Never true     Ran Out of Food in the Last Year: Never true   Transportation Needs: No Transportation Needs (10/23/2023)    PRAPARE - Transportation     Lack of Transportation (Medical): No     Lack of Transportation (Non-Medical): No   Physical Activity: Sufficiently Active (10/23/2023)    Exercise Vital Sign     Days of Exercise per Week: 4 days     Minutes of Exercise per Session: 60 min   Stress: No Stress Concern Present (10/23/2023)    [employer] - Occupational Stress Questionnaire     Feeling of Stress : Not at all   Social Connections: Moderately Integrated (10/23/2023)    Social Connection and Isolation Panel [NHANES]     Frequency of Communication with Friends and Family: More than three times a week     Frequency of Social Gatherings with Friends and Family: More than three times a week     Attends Evangelical Services: More than 4 times per year     Active Member of Clubs or Organizations: No     Attends Club or Organization Meetings: Never     Marital Status:    Intimate Partner Violence: Not on file   Housing Stability: Low Risk  (10/23/2023)    Housing Stability Vital Sign     Unable to Pay for Housing in the Last Year: No     Number of Places Lived in the Last Year: 1     Unstable Housing in the Last  "Year: No     No Known Allergies  Outpatient Encounter Medications as of 12/7/2023   Medication Sig Dispense Refill    Dulaglutide (TRULICITY) 0.75 MG/0.5ML Solution Pen-injector Inject 0.5 mL under the skin every 7 days. 6 mL 1    sildenafil citrate (VIAGRA) 50 MG tablet Take 1 Tablet by mouth 1 time a day as needed for Erectile Dysfunction. 10 Tablet 3    Omega-3 Fatty Acids (FISH OIL BURP-LESS PO)       nitroglycerin (NITROSTAT) 0.4 MG SL Tab Dissolve 1 tablet under the tongue as needed for chest pain. May repeat 2 times every 5 minutes. Call 911 if pain persists longer than 5 minutes after the first dose. Continue to take the second and third doses if pain persists.      ticagrelor (BRILINTA) 90 MG Tab tablet Take 1 Tablet by mouth 2 times a day. 180 Tablet 1    lisinopril (PRINIVIL) 5 MG Tab Take 1 Tablet by mouth every day. 90 Tablet 3    atorvastatin (LIPITOR) 80 MG tablet Take 1 Tablet by mouth every evening. 90 Tablet 3    metoprolol tartrate (LOPRESSOR) 25 MG Tab Take 1 Tablet by mouth every day. 90 Tablet 3    omeprazole (PRILOSEC) 20 MG delayed-release capsule Take 1 Capsule by mouth every day.      aspirin EC (ECOTRIN) 81 MG Tablet Delayed Response Take 1 Tablet by mouth every day.       No facility-administered encounter medications on file as of 12/7/2023.     Review of Systems   Respiratory:  Negative for cough and shortness of breath.    Cardiovascular:  Negative for chest pain and palpitations.   Musculoskeletal:  Negative for myalgias.   Neurological:  Negative for dizziness and loss of consciousness.              Objective     /76 (BP Location: Left arm, Patient Position: Sitting, BP Cuff Size: Adult)   Pulse (!) 106   Ht 1.727 m (5' 8\")   Wt 94.8 kg (209 lb)   SpO2 97%   BMI 31.78 kg/m²     Physical Exam  Vitals reviewed.   Constitutional:       General: He is not in acute distress.     Appearance: He is well-developed.   Neck:      Vascular: No JVD.   Cardiovascular:      Rate and " Rhythm: Normal rate and regular rhythm.      Pulses:           Carotid pulses are 1+ on the right side and 1+ on the left side.       Radial pulses are 1+ on the right side and 1+ on the left side.      Heart sounds: Normal heart sounds. No murmur heard.     No friction rub. No gallop.   Pulmonary:      Effort: Pulmonary effort is normal. No accessory muscle usage or respiratory distress.      Breath sounds: Normal breath sounds. No wheezing or rales.   Abdominal:      General: There is no distension.      Palpations: Abdomen is soft. There is no mass.      Tenderness: There is no abdominal tenderness.   Musculoskeletal:      Cervical back: Normal range of motion and neck supple.      Right lower leg: No edema.      Left lower leg: No edema.   Skin:     General: Skin is warm and dry.      Findings: No rash.      Nails: There is no clubbing.   Neurological:      Mental Status: He is alert and oriented to person, place, and time.   Psychiatric:         Behavior: Behavior normal.               Cardiac Imaging and Procedures Review:    EKG dated 11/15/22: My personal interpretation is sinus rhythm, 79 bpm, borderline ST elevation consistent with early repolarization, no significant change from prior ECG of 11/15/22.   .  ECHOCARDIOGRAM 2/16/2022  1. Mild concentric left ventricular hypertrophy with systolic function at lower limits of   normal. Estimated EF 50%. Mid septal to apical hypokinesis. Normal diastolic function. Normal   right ventricular size and function. Normal biatrial size.   2. Normal appearing valves without significant flow abnormalities.   3. Normal sized IVC. Estimated PA systolic pressure 15 mm Hg.    ECHOCARDIOGRAM 11/16/2022  Left ventricular systolic function is normal. The left ventricular   ejection fraction is visually estimated to be 55%.  Normal right ventricular size and systolic function.  No significant valvular abnormalities.   No prior study is available for comparison.     MPI  11/16/2022  o myocardial infarct or reversible ischemia.   Normal LEFT ventricular function.   ECG INTERPRETATION   Negative treadmill EKG for ischemia.    The patient exercised for 6 minutes, 0 seconds, and 7.1 METs.   Average exercise capacity for age and sex.   Normal blood pressure response to exercise.     [facility] treadmill score -0.5 (medium risk, assuming no prior coronary artery    disease)    Cardiac Catheterization dated 2/16/22:   Results are not available for review.  However per discharge summary, patient had a complete LAD occlusion with mild disease of the left circumflex and right coronary artery.  Successful stenting of the LAD and diagonal artery.     EKG 6/21/2023 sinus rhythm rate 61 personally interpreted    Assessment & Plan     1. Coronary artery disease, occlusive        2. S/P drug eluting coronary stent placement        3. Hypertension associated with type 2 diabetes mellitus (HCC)        4. Hyperlipidemia associated with type 2 diabetes mellitus (HCC)        5. Dyslipidemia  LIPID PANEL          Medical Decision Making: Today's Assessment/Status/Plan:   CAD, clinically stable with no ischemic symptoms, continue aspirin, atorvastatin, metoprolol  Hypertension, BP normal, at goal, continue lisinopril, BP goal less than 130/80  Dyslipidemia LDL 62, continue atorvastatin, recheck lipid panel  Diabetes mellitus, A1c 6.0%, instructed patient to contact PCP to arrange for renewal of Trulicity.  RTC 9 months.

## 2023-12-11 DIAGNOSIS — E11.59 TYPE 2 DIABETES MELLITUS WITH OTHER CIRCULATORY COMPLICATION, WITHOUT LONG-TERM CURRENT USE OF INSULIN (HCC): ICD-10-CM

## 2023-12-11 DIAGNOSIS — I25.10 CORONARY ARTERY DISEASE DUE TO CALCIFIED CORONARY LESION: ICD-10-CM

## 2023-12-11 DIAGNOSIS — I25.84 CORONARY ARTERY DISEASE DUE TO CALCIFIED CORONARY LESION: ICD-10-CM

## 2023-12-11 RX ORDER — DULAGLUTIDE 0.75 MG/.5ML
0.5 INJECTION, SOLUTION SUBCUTANEOUS
Qty: 6 ML | Refills: 0 | Status: SHIPPED | OUTPATIENT
Start: 2023-12-11 | End: 2023-12-12 | Stop reason: SDUPTHER

## 2023-12-11 RX ORDER — SILDENAFIL 50 MG/1
50 TABLET, FILM COATED ORAL
Qty: 10 TABLET | Refills: 11 | Status: SHIPPED | OUTPATIENT
Start: 2023-12-11

## 2023-12-11 RX ORDER — DULAGLUTIDE 0.75 MG/.5ML
0.5 INJECTION, SOLUTION SUBCUTANEOUS
Qty: 6 ML | Refills: 1 | OUTPATIENT
Start: 2023-12-11

## 2023-12-12 DIAGNOSIS — E11.59 TYPE 2 DIABETES MELLITUS WITH OTHER CIRCULATORY COMPLICATION, WITHOUT LONG-TERM CURRENT USE OF INSULIN (HCC): ICD-10-CM

## 2023-12-12 RX ORDER — DULAGLUTIDE 0.75 MG/.5ML
0.5 INJECTION, SOLUTION SUBCUTANEOUS
Qty: 6 ML | Refills: 3 | Status: SHIPPED | OUTPATIENT
Start: 2023-12-12

## 2023-12-13 ENCOUNTER — APPOINTMENT (OUTPATIENT)
Dept: MEDICAL GROUP | Facility: PHYSICIAN GROUP | Age: 33
End: 2023-12-13
Payer: COMMERCIAL

## 2023-12-13 RX ORDER — TICAGRELOR 90 MG/1
90 TABLET ORAL 2 TIMES DAILY
Qty: 180 TABLET | Refills: 3 | Status: SHIPPED | OUTPATIENT
Start: 2023-12-13

## 2023-12-13 NOTE — TELEPHONE ENCOUNTER
To SW: Patient requested a refill of Brilinta but was last ordered by physician outside of our clinic and no mention of it in recent OV note. Please advise if okay to refill under you, thank you!

## 2023-12-15 DIAGNOSIS — I10 PRIMARY HYPERTENSION: ICD-10-CM

## 2023-12-15 DIAGNOSIS — E11.59 TYPE 2 DIABETES MELLITUS WITH OTHER CIRCULATORY COMPLICATION, WITHOUT LONG-TERM CURRENT USE OF INSULIN (HCC): ICD-10-CM

## 2023-12-15 DIAGNOSIS — E78.1 HYPERTRIGLYCERIDEMIA: ICD-10-CM

## 2023-12-15 RX ORDER — ATORVASTATIN CALCIUM 80 MG/1
80 TABLET, FILM COATED ORAL NIGHTLY
Qty: 90 TABLET | Refills: 3 | Status: SHIPPED | OUTPATIENT
Start: 2023-12-15

## 2023-12-15 RX ORDER — LISINOPRIL 5 MG/1
5 TABLET ORAL DAILY
Qty: 90 TABLET | Refills: 3 | Status: SHIPPED | OUTPATIENT
Start: 2023-12-15

## 2023-12-15 RX ORDER — DULAGLUTIDE 0.75 MG/.5ML
0.5 INJECTION, SOLUTION SUBCUTANEOUS
Qty: 6 ML | Refills: 0 | Status: CANCELLED | OUTPATIENT
Start: 2023-12-15

## 2023-12-19 DIAGNOSIS — E11.59 TYPE 2 DIABETES MELLITUS WITH OTHER CIRCULATORY COMPLICATION, WITHOUT LONG-TERM CURRENT USE OF INSULIN (HCC): ICD-10-CM

## 2023-12-19 DIAGNOSIS — I10 PRIMARY HYPERTENSION: ICD-10-CM

## 2023-12-31 ENCOUNTER — HOSPITAL ENCOUNTER (EMERGENCY)
Facility: MEDICAL CENTER | Age: 33
End: 2023-12-31
Attending: EMERGENCY MEDICINE
Payer: COMMERCIAL

## 2023-12-31 ENCOUNTER — APPOINTMENT (OUTPATIENT)
Dept: URGENT CARE | Facility: CLINIC | Age: 33
End: 2023-12-31
Payer: COMMERCIAL

## 2023-12-31 ENCOUNTER — APPOINTMENT (OUTPATIENT)
Dept: RADIOLOGY | Facility: MEDICAL CENTER | Age: 33
End: 2023-12-31
Attending: EMERGENCY MEDICINE
Payer: COMMERCIAL

## 2023-12-31 VITALS
HEART RATE: 78 BPM | TEMPERATURE: 97.8 F | RESPIRATION RATE: 17 BRPM | SYSTOLIC BLOOD PRESSURE: 123 MMHG | BODY MASS INDEX: 32.04 KG/M2 | HEIGHT: 68 IN | WEIGHT: 211.42 LBS | DIASTOLIC BLOOD PRESSURE: 84 MMHG | OXYGEN SATURATION: 97 %

## 2023-12-31 DIAGNOSIS — K11.20 PAROTIDITIS: ICD-10-CM

## 2023-12-31 LAB
ALBUMIN SERPL BCP-MCNC: 5.1 G/DL (ref 3.2–4.9)
ALBUMIN/GLOB SERPL: 1.6 G/DL
ALP SERPL-CCNC: 67 U/L (ref 30–99)
ALT SERPL-CCNC: 56 U/L (ref 2–50)
ANION GAP SERPL CALC-SCNC: 16 MMOL/L (ref 7–16)
AST SERPL-CCNC: 31 U/L (ref 12–45)
BASOPHILS # BLD AUTO: 0.5 % (ref 0–1.8)
BASOPHILS # BLD: 0.06 K/UL (ref 0–0.12)
BILIRUB SERPL-MCNC: 0.9 MG/DL (ref 0.1–1.5)
BUN SERPL-MCNC: 16 MG/DL (ref 8–22)
CALCIUM ALBUM COR SERPL-MCNC: 9 MG/DL (ref 8.5–10.5)
CALCIUM SERPL-MCNC: 9.9 MG/DL (ref 8.5–10.5)
CHLORIDE SERPL-SCNC: 100 MMOL/L (ref 96–112)
CO2 SERPL-SCNC: 22 MMOL/L (ref 20–33)
CREAT SERPL-MCNC: 0.92 MG/DL (ref 0.5–1.4)
EOSINOPHIL # BLD AUTO: 0.37 K/UL (ref 0–0.51)
EOSINOPHIL NFR BLD: 2.8 % (ref 0–6.9)
ERYTHROCYTE [DISTWIDTH] IN BLOOD BY AUTOMATED COUNT: 42.1 FL (ref 35.9–50)
GFR SERPLBLD CREATININE-BSD FMLA CKD-EPI: 113 ML/MIN/1.73 M 2
GLOBULIN SER CALC-MCNC: 3.1 G/DL (ref 1.9–3.5)
GLUCOSE SERPL-MCNC: 100 MG/DL (ref 65–99)
HCT VFR BLD AUTO: 46.2 % (ref 42–52)
HGB BLD-MCNC: 16 G/DL (ref 14–18)
IMM GRANULOCYTES # BLD AUTO: 0.04 K/UL (ref 0–0.11)
IMM GRANULOCYTES NFR BLD AUTO: 0.3 % (ref 0–0.9)
LYMPHOCYTES # BLD AUTO: 2.73 K/UL (ref 1–4.8)
LYMPHOCYTES NFR BLD: 21 % (ref 22–41)
MCH RBC QN AUTO: 33.5 PG (ref 27–33)
MCHC RBC AUTO-ENTMCNC: 34.6 G/DL (ref 32.3–36.5)
MCV RBC AUTO: 96.7 FL (ref 81.4–97.8)
MONOCYTES # BLD AUTO: 0.71 K/UL (ref 0–0.85)
MONOCYTES NFR BLD AUTO: 5.5 % (ref 0–13.4)
NEUTROPHILS # BLD AUTO: 9.08 K/UL (ref 1.82–7.42)
NEUTROPHILS NFR BLD: 69.9 % (ref 44–72)
NRBC # BLD AUTO: 0 K/UL
NRBC BLD-RTO: 0 /100 WBC (ref 0–0.2)
PLATELET # BLD AUTO: 308 K/UL (ref 164–446)
PMV BLD AUTO: 9.2 FL (ref 9–12.9)
POTASSIUM SERPL-SCNC: 4 MMOL/L (ref 3.6–5.5)
PROT SERPL-MCNC: 8.2 G/DL (ref 6–8.2)
RBC # BLD AUTO: 4.78 M/UL (ref 4.7–6.1)
SODIUM SERPL-SCNC: 138 MMOL/L (ref 135–145)
WBC # BLD AUTO: 13 K/UL (ref 4.8–10.8)

## 2023-12-31 PROCEDURE — 85025 COMPLETE CBC W/AUTO DIFF WBC: CPT

## 2023-12-31 PROCEDURE — 700105 HCHG RX REV CODE 258: Performed by: EMERGENCY MEDICINE

## 2023-12-31 PROCEDURE — 700117 HCHG RX CONTRAST REV CODE 255: Performed by: EMERGENCY MEDICINE

## 2023-12-31 PROCEDURE — 700102 HCHG RX REV CODE 250 W/ 637 OVERRIDE(OP): Performed by: EMERGENCY MEDICINE

## 2023-12-31 PROCEDURE — 99284 EMERGENCY DEPT VISIT MOD MDM: CPT

## 2023-12-31 PROCEDURE — 36415 COLL VENOUS BLD VENIPUNCTURE: CPT

## 2023-12-31 PROCEDURE — 70491 CT SOFT TISSUE NECK W/DYE: CPT

## 2023-12-31 PROCEDURE — 80053 COMPREHEN METABOLIC PANEL: CPT

## 2023-12-31 PROCEDURE — A9270 NON-COVERED ITEM OR SERVICE: HCPCS | Performed by: EMERGENCY MEDICINE

## 2023-12-31 RX ORDER — SODIUM CHLORIDE, SODIUM LACTATE, POTASSIUM CHLORIDE, CALCIUM CHLORIDE 600; 310; 30; 20 MG/100ML; MG/100ML; MG/100ML; MG/100ML
1000 INJECTION, SOLUTION INTRAVENOUS ONCE
Status: COMPLETED | OUTPATIENT
Start: 2023-12-31 | End: 2023-12-31

## 2023-12-31 RX ORDER — AMOXICILLIN AND CLAVULANATE POTASSIUM 875; 125 MG/1; MG/1
1 TABLET, FILM COATED ORAL 2 TIMES DAILY
Qty: 20 TABLET | Refills: 0 | Status: ACTIVE | OUTPATIENT
Start: 2023-12-31 | End: 2023-12-31

## 2023-12-31 RX ORDER — AMOXICILLIN AND CLAVULANATE POTASSIUM 875; 125 MG/1; MG/1
1 TABLET, FILM COATED ORAL ONCE
Status: COMPLETED | OUTPATIENT
Start: 2023-12-31 | End: 2023-12-31

## 2023-12-31 RX ORDER — AMOXICILLIN AND CLAVULANATE POTASSIUM 875; 125 MG/1; MG/1
1 TABLET, FILM COATED ORAL 2 TIMES DAILY
Qty: 20 TABLET | Refills: 0 | Status: ACTIVE | OUTPATIENT
Start: 2023-12-31 | End: 2024-01-10

## 2023-12-31 RX ADMIN — SODIUM CHLORIDE, POTASSIUM CHLORIDE, SODIUM LACTATE AND CALCIUM CHLORIDE 1000 ML: 600; 310; 30; 20 INJECTION, SOLUTION INTRAVENOUS at 16:16

## 2023-12-31 RX ADMIN — IOHEXOL 90 ML: 350 INJECTION, SOLUTION INTRAVENOUS at 18:15

## 2023-12-31 RX ADMIN — AMOXICILLIN AND CLAVULANATE POTASSIUM 1 TABLET: 875; 125 TABLET, FILM COATED ORAL at 18:12

## 2023-12-31 ASSESSMENT — FIBROSIS 4 INDEX: FIB4 SCORE: 0.32

## 2023-12-31 NOTE — ED TRIAGE NOTES
Chief Complaint   Patient presents with    Jaw Swelling     Onset this morning     Pt ambulated to triage c/o right jaw swelling started this morning, pain only when he chews. Denies dental pain, no difficulty swallowing , speaking full sentences.

## 2024-01-01 NOTE — ED PROVIDER NOTES
CHIEF COMPLAINT  Chief Complaint   Patient presents with    Jaw Swelling     Onset this morning       LIMITATION TO HISTORY   Select: none    HPI    Isidro Crow is a 33 y.o. male who presents to the Emergency Department complaining of right-sided swelling of his face.  The patient noticed that this morning he started having some swelling to his right side of his face.  It did seem to go down and then he ate something and it got worse.  The patient describes pain to the area but denies any fevers chills nausea vomiting he presents emerged department for evaluation of this.  The patient initially went to an urgent care and they told him that he needed to come to the emergency department.  Upon arrival here he complains of the right-sided facial swelling denies any other symptoms.    OUTSIDE HISTORIAN(S):  Select: None    EXTERNAL RECORDS REVIEWED  Select: Other she does have a significant past medical history was recently seen by his cardiologist 6/21/2023 he had a history of coronary disease with an anterior MI with PCI of the LAD has been doing otherwise well.  He has a history of diabetes as well.  Most recent laboratory studies show a CBC of 11/16/2022 which a normal white blood cell count of 8.5.  H&H is normal.  Recent chemistries 11/17/2022 which the glucose was 147      PAST MEDICAL HISTORY  Past Medical History:   Diagnosis Date    Chest pain 11/15/2022    Cigarette nicotine dependence in remission 6/16/2023    He quit after his MI with the support of a nicotine patch in February 2022. He started smoking when he was 22 and smoked 4-5 cigarettes a day. He denies cravings.    Disease due to severe acute respiratory syndrome coronavirus 2 (SARS-CoV-2) 5/19/2022    Dyslipidemia 6/21/2023    MI (myocardial infarction) (HCC)      .    SURGICAL HISTORY  Past Surgical History:   Procedure Laterality Date    CHOLECYSTECTOMY      STENT PLACEMENT      drug eluding stent placement.         FAMILY  HISTORY  Family History   Problem Relation Age of Onset    No Known Problems Mother     No Known Problems Sister     No Known Problems Brother     Diabetes Maternal Grandmother     Hyperlipidemia Paternal Grandmother     Hypertension Paternal Grandmother     Heart Disease Paternal Grandmother     Diabetes Paternal Grandmother     Cancer Neg Hx     Stroke Neg Hx           SOCIAL HISTORY  Social History     Socioeconomic History    Marital status:      Spouse name: Not on file    Number of children: Not on file    Years of education: Not on file    Highest education level: Some college, no degree   Occupational History    Not on file   Tobacco Use    Smoking status: Former     Current packs/day: 0.00     Types: Cigarettes, Electronic Cigarettes     Quit date: 2/15/2022     Years since quittin.8    Smokeless tobacco: Never   Vaping Use    Vaping Use: Former   Substance and Sexual Activity    Alcohol use: Yes     Alcohol/week: 0.6 oz     Types: 1 Cans of beer per week     Comment: Occ    Drug use: Never    Sexual activity: Yes     Partners: Female     Birth control/protection: I.U.D.   Other Topics Concern    Not on file   Social History Narrative    Not on file     Social Determinants of Health     Financial Resource Strain: Low Risk  (10/23/2023)    Overall Financial Resource Strain (CARDIA)     Difficulty of Paying Living Expenses: Not very hard   Food Insecurity: No Food Insecurity (10/23/2023)    Hunger Vital Sign     Worried About Running Out of Food in the Last Year: Never true     Ran Out of Food in the Last Year: Never true   Transportation Needs: No Transportation Needs (10/23/2023)    PRAPARE - Transportation     Lack of Transportation (Medical): No     Lack of Transportation (Non-Medical): No   Physical Activity: Sufficiently Active (10/23/2023)    Exercise Vital Sign     Days of Exercise per Week: 4 days     Minutes of Exercise per Session: 60 min   Stress: No Stress Concern Present (10/23/2023)     [facility] of [employer]ational Health - Occupational Stress Questionnaire     Feeling of Stress : Not at all   Social Connections: Moderately Integrated (10/23/2023)    Social Connection and Isolation Panel [NHANES]     Frequency of Communication with Friends and Family: More than three times a week     Frequency of Social Gatherings with Friends and Family: More than three times a week     Attends Mandae[employer] Services: More than 4 times per year     Active Member of Clubs or Organizations: No     Attends Club or Organization Meetings: Never     Marital Status:    Intimate Partner Violence: Not on file   Housing Stability: Low Risk  (10/23/2023)    Housing Stability Vital Sign     Unable to Pay for Housing in the Last Year: No     Number of Places Lived in the Last Year: 1     Unstable Housing in the Last Year: No         CURRENT MEDICATIONS  No current facility-administered medications on file prior to encounter.     Current Outpatient Medications on File Prior to Encounter   Medication Sig Dispense Refill    metoprolol tartrate (LOPRESSOR) 25 MG Tab Take 1 Tablet by mouth every day. 90 Tablet 3    metformin (GLUCOPHAGE) 1000 MG tablet Take 1 Tablet by mouth 2 times a day with meals. 180 Tablet 3    atorvastatin (LIPITOR) 80 MG tablet Take 1 Tablet by mouth every evening. 90 Tablet 3    lisinopril (PRINIVIL) 5 MG Tab Take 1 Tablet by mouth every day. 90 Tablet 3    BRILINTA 90 MG Tab tablet TAKE 1 TABLET TWICE A  Tablet 3    Dulaglutide (TRULICITY) 0.75 MG/0.5ML Solution Pen-injector Inject 0.5 mL under the skin every 7 days. 6 mL 3    sildenafil citrate (VIAGRA) 50 MG tablet Take 1 Tablet by mouth 1 time a day as needed for Erectile Dysfunction. 10 Tablet 11    Omega-3 Fatty Acids (FISH OIL BURP-LESS PO)       nitroglycerin (NITROSTAT) 0.4 MG SL Tab Dissolve 1 tablet under the tongue as needed for chest pain. May repeat 2 times every 5 minutes. Call 911 if pain persists longer than 5 minutes  "after the first dose. Continue to take the second and third doses if pain persists.      omeprazole (PRILOSEC) 20 MG delayed-release capsule Take 1 Capsule by mouth every day.      aspirin EC (ECOTRIN) 81 MG Tablet Delayed Response Take 1 Tablet by mouth every day.             ALLERGIES  No Known Allergies    PHYSICAL EXAM  VITAL SIGNS:/84   Pulse 78   Temp 36.6 °C (97.8 °F) (Temporal)   Resp 17   Ht 1.727 m (5' 8\")   Wt 95.9 kg (211 lb 6.7 oz)   SpO2 97%   BMI 32.15 kg/m²     Constitutional: Well-developed no acute distress   HENT: Normocephalic, Atraumatic, Bilateral external ears normal.  Patient's right side of his face and into his jaw is slightly edematous.  There is no significant fluctuance felt he is mildly tender but no signs of erythema.  The swelling does go around the base of the jaw.  Has no signs of dental involvement no dental pain no floor of the mouth discomfort no signs of edema in the mouth.  Eyes:  conjunctiva are normal.   Neck: Supple.  Nontender midline no significant lymphadenopathy.  He is tender around the jawline on that left side  Cardiovascular: Regular rate and rhythm without murmurs gallops or rubs.   Thorax & Lungs: No respiratory distress. Breathing comfortably. Lungs are clear to auscultation bilaterally, there are no wheezes no rales. Chest wall is nontender.  Abdomen: Soft, non distended, non tender   Skin: Warm, Dry, No erythema,   Back: No tenderness, No CVA tenderness.  Musculoskeletal: No clubbing cyanosis or edema good range of motion   Neurologic: Alert & oriented x 3, normal sensation moving all extremities appears normal   Psychiatric: Affect normal, Judgment normal, Mood normal.       DIAGNOSTIC STUDIES / PROCEDURES    LABS    Results for orders placed or performed during the hospital encounter of 12/31/23   CBC WITH DIFFERENTIAL   Result Value Ref Range    WBC 13.0 (H) 4.8 - 10.8 K/uL    RBC 4.78 4.70 - 6.10 M/uL    Hemoglobin 16.0 14.0 - 18.0 g/dL    " Hematocrit 46.2 42.0 - 52.0 %    MCV 96.7 81.4 - 97.8 fL    MCH 33.5 (H) 27.0 - 33.0 pg    MCHC 34.6 32.3 - 36.5 g/dL    RDW 42.1 35.9 - 50.0 fL    Platelet Count 308 164 - 446 K/uL    MPV 9.2 9.0 - 12.9 fL    Neutrophils-Polys 69.90 44.00 - 72.00 %    Lymphocytes 21.00 (L) 22.00 - 41.00 %    Monocytes 5.50 0.00 - 13.40 %    Eosinophils 2.80 0.00 - 6.90 %    Basophils 0.50 0.00 - 1.80 %    Immature Granulocytes 0.30 0.00 - 0.90 %    Nucleated RBC 0.00 0.00 - 0.20 /100 WBC    Neutrophils (Absolute) 9.08 (H) 1.82 - 7.42 K/uL    Lymphs (Absolute) 2.73 1.00 - 4.80 K/uL    Monos (Absolute) 0.71 0.00 - 0.85 K/uL    Eos (Absolute) 0.37 0.00 - 0.51 K/uL    Baso (Absolute) 0.06 0.00 - 0.12 K/uL    Immature Granulocytes (abs) 0.04 0.00 - 0.11 K/uL    NRBC (Absolute) 0.00 K/uL   COMP METABOLIC PANEL   Result Value Ref Range    Sodium 138 135 - 145 mmol/L    Potassium 4.0 3.6 - 5.5 mmol/L    Chloride 100 96 - 112 mmol/L    Co2 22 20 - 33 mmol/L    Anion Gap 16.0 7.0 - 16.0    Glucose 100 (H) 65 - 99 mg/dL    Bun 16 8 - 22 mg/dL    Creatinine 0.92 0.50 - 1.40 mg/dL    Calcium 9.9 8.5 - 10.5 mg/dL    Correct Calcium 9.0 8.5 - 10.5 mg/dL    AST(SGOT) 31 12 - 45 U/L    ALT(SGPT) 56 (H) 2 - 50 U/L    Alkaline Phosphatase 67 30 - 99 U/L    Total Bilirubin 0.9 0.1 - 1.5 mg/dL    Albumin 5.1 (H) 3.2 - 4.9 g/dL    Total Protein 8.2 6.0 - 8.2 g/dL    Globulin 3.1 1.9 - 3.5 g/dL    A-G Ratio 1.6 g/dL   ESTIMATED GFR   Result Value Ref Range    GFR (CKD-EPI) 113 >60 mL/min/1.73 m 2       RADIOLOGY    Radiology interpreted by myself as: No overt sign of an abscess there are inflammatory changes to the right parotid gland.      CT-SOFT TISSUE NECK WITH   Final Result      1.  Enlarged edematous RIGHT parotid gland consistent with parotiditis.  Surrounding inflammation extends inferiorly with thickening of the platysma.  No drainable abscess.   2.  Mild chronic paranasal sinus disease.           COURSE & MEDICAL DECISION MAKING    ED  COURSE:    ED Observation Status?  No, the patient does not meet observation criteria.  INTERVENTIONS BY ME:  Medications   lactated ringers (LR) bolus (0 mL Intravenous Stopped 12/31/23 1730)   iohexol (OMNIPAQUE) 350 mg/mL (IV) (90 mL Intravenous Given 12/31/23 1815)   amoxicillin-clavulanate (Augmentin) 875-125 MG per tablet 1 Tablet (1 Tablet Oral Given 12/31/23 1812)           INITIAL ASSESSMENT, COURSE AND PLAN  Care Narrative: Patient presents emerged department for evaluation.  Clinically the patient has a soft parotid on the right side it is not hard or indurated.  I did do a CT scan just to ensure that there was no deep space infection and on the CT scan results shows no signs of abscess formation however there is inflammatory changes to the parotid that does extend into the soft tissues of the neck.  At this point it is most likely parotitis.  I recommended sialagogues and I will start the patient on empiric antibiotics.  He does have a slightly elevated white blood cell count of 13.0 electrolytes are otherwise normal.  At this point I do feel the patient stable for discharge.  I recommended the above therapy and recommended return if any symptoms worsen.      ADDITIONAL PROBLEM LIST  Patient does have diabetes mellitus  DISPOSITION AND DISCUSSIONS    Patient be discharged stable condition.    FINAL DIAGNOSIS  1. Parotiditis           The patient will return for new or worsening symptoms and is stable at the time of discharge.    The patient is referred to a primary physician for blood pressure management, diabetic screening, and for all other preventative health concerns.        DISPOSITION:  Patient will be discharged home in stable condition.    FOLLOW UP:  [facility], Emergency Dept  [address] [facility] [address]  [phone]    Return if any symptoms worsen    DISHA ValenzuelaR.N.  [address]  [facility] [id number] [phone]    Schedule  an appointment as soon as possible for a visit in 1 week  As needed      OUTPATIENT MEDICATIONS:  Discharge Medication List as of 12/31/2023  6:17 PM                Electronically signed by: Fred Keneny M.D.,8:07 PM 12/31/23

## 2024-01-01 NOTE — ED NOTES
Pt discharged to home. Pt was given follow up instructions and prescriptions for Augmentin. All lines removed prior to discharge. Pt verbalized understanding of all instructions for discharge and is ambulatory out of ED with steady gait. AOx4

## 2024-03-11 DIAGNOSIS — I25.10 CORONARY ARTERY DISEASE, OCCLUSIVE: ICD-10-CM

## 2024-09-19 NOTE — TELEPHONE ENCOUNTER
Received request via: Pharmacy    Was the patient seen in the last year in this department? Yes    Does the patient have an active prescription (recently filled or refills available) for medication(s) requested? No    Does the patient have [facility] and need 100 day supply (blood pressure, diabetes and cholesterol meds only)? Patient does not have SCP   Abdominal pain

## 2024-11-21 ENCOUNTER — TELEPHONE (OUTPATIENT)
Dept: CARDIOLOGY | Facility: MEDICAL CENTER | Age: 34
End: 2024-11-21
Payer: COMMERCIAL

## 2024-12-05 VITALS — SYSTOLIC BLOOD PRESSURE: 106 MMHG | HEART RATE: 84 BPM | DIASTOLIC BLOOD PRESSURE: 66 MMHG

## 2024-12-06 ENCOUNTER — OFFICE VISIT (OUTPATIENT)
Dept: CARDIOLOGY | Facility: MEDICAL CENTER | Age: 34
End: 2024-12-06
Attending: INTERNAL MEDICINE
Payer: COMMERCIAL

## 2024-12-06 VITALS
BODY MASS INDEX: 29.55 KG/M2 | DIASTOLIC BLOOD PRESSURE: 70 MMHG | HEIGHT: 68 IN | WEIGHT: 195 LBS | OXYGEN SATURATION: 97 % | RESPIRATION RATE: 16 BRPM | SYSTOLIC BLOOD PRESSURE: 114 MMHG | HEART RATE: 99 BPM

## 2024-12-06 DIAGNOSIS — Z95.5 S/P DRUG ELUTING CORONARY STENT PLACEMENT: ICD-10-CM

## 2024-12-06 DIAGNOSIS — E78.1 HYPERTRIGLYCERIDEMIA: ICD-10-CM

## 2024-12-06 DIAGNOSIS — I25.10 CORONARY ARTERY DISEASE DUE TO CALCIFIED CORONARY LESION: ICD-10-CM

## 2024-12-06 DIAGNOSIS — E78.5 HYPERLIPIDEMIA ASSOCIATED WITH TYPE 2 DIABETES MELLITUS (HCC): ICD-10-CM

## 2024-12-06 DIAGNOSIS — E11.59 HYPERTENSION ASSOCIATED WITH TYPE 2 DIABETES MELLITUS (HCC): ICD-10-CM

## 2024-12-06 DIAGNOSIS — I15.2 HYPERTENSION ASSOCIATED WITH TYPE 2 DIABETES MELLITUS (HCC): ICD-10-CM

## 2024-12-06 DIAGNOSIS — E11.69 HYPERLIPIDEMIA ASSOCIATED WITH TYPE 2 DIABETES MELLITUS (HCC): ICD-10-CM

## 2024-12-06 DIAGNOSIS — I25.84 CORONARY ARTERY DISEASE DUE TO CALCIFIED CORONARY LESION: ICD-10-CM

## 2024-12-06 DIAGNOSIS — I10 PRIMARY HYPERTENSION: ICD-10-CM

## 2024-12-06 PROCEDURE — 3074F SYST BP LT 130 MM HG: CPT | Performed by: INTERNAL MEDICINE

## 2024-12-06 PROCEDURE — 99215 OFFICE O/P EST HI 40 MIN: CPT | Performed by: INTERNAL MEDICINE

## 2024-12-06 PROCEDURE — 99212 OFFICE O/P EST SF 10 MIN: CPT | Performed by: INTERNAL MEDICINE

## 2024-12-06 PROCEDURE — 3078F DIAST BP <80 MM HG: CPT | Performed by: INTERNAL MEDICINE

## 2024-12-06 RX ORDER — LISINOPRIL 5 MG/1
5 TABLET ORAL DAILY
Qty: 90 TABLET | Refills: 3 | Status: SHIPPED | OUTPATIENT
Start: 2024-12-06

## 2024-12-06 RX ORDER — METOPROLOL TARTRATE 25 MG/1
25 TABLET, FILM COATED ORAL DAILY
Qty: 90 TABLET | Refills: 3 | Status: SHIPPED | OUTPATIENT
Start: 2024-12-06

## 2024-12-06 RX ORDER — ATORVASTATIN CALCIUM 80 MG/1
80 TABLET, FILM COATED ORAL NIGHTLY
Qty: 90 TABLET | Refills: 3 | Status: SHIPPED | OUTPATIENT
Start: 2024-12-06

## 2024-12-06 ASSESSMENT — ENCOUNTER SYMPTOMS
CLAUDICATION: 0
CHILLS: 0
HEADACHES: 0
GASTROINTESTINAL NEGATIVE: 1
RESPIRATORY NEGATIVE: 1
MUSCULOSKELETAL NEGATIVE: 1
DEPRESSION: 0
DIZZINESS: 0
NERVOUS/ANXIOUS: 0
ABDOMINAL PAIN: 0
BLURRED VISION: 0
EYES NEGATIVE: 1
CONSTITUTIONAL NEGATIVE: 1
PALPITATIONS: 0
BRUISES/BLEEDS EASILY: 0
MYALGIAS: 0
CARDIOVASCULAR NEGATIVE: 1
DOUBLE VISION: 0
VOMITING: 0
FEVER: 0
COUGH: 0
PSYCHIATRIC NEGATIVE: 1
NEUROLOGICAL NEGATIVE: 1
FOCAL WEAKNESS: 0
WEAKNESS: 0
SHORTNESS OF BREATH: 0
NAUSEA: 0
WEIGHT LOSS: 0

## 2024-12-06 ASSESSMENT — FIBROSIS 4 INDEX: FIB4 SCORE: 0.46

## 2024-12-06 NOTE — PROGRESS NOTES
Chief Complaint   Patient presents with   • Coronary Artery Disease   • Hyperlipidemia   • Aortic Atherosclerosis     F/v dx: Atherosclerotic heart disease of native coronary artery with unspecified angina pectoris (HCC)       Subjective     Isidro Crow is a 34 y.o. male who presents today for new patient establishment for coronary artery disease with prior stent placement, hypertension and hyperlipidemia.    Since the patient's last visit on 12/03/24 with Wayne Malloy who is retiring, he has been doing well clinically from cardiac standpoint. He denies fatigue, chest pain, shortness of breath, palpitations, lower extremity edema, dizziness or syncope. 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     Past Medical History:   Diagnosis Date   • CAD (coronary artery disease)    • Chest pain 11/15/2022   • Cigarette nicotine dependence in remission 06/16/2023    He quit after his MI with the support of a nicotine patch in February 2022. He started smoking when he was 22 and smoked 4-5 cigarettes a day. He denies cravings.   • Disease due to severe acute respiratory syndrome coronavirus 2 (SARS-CoV-2) 05/19/2022   • Dyslipidemia 06/21/2023   • Hypertension    • MI (myocardial infarction) (HCC)      Past Surgical History:   Procedure Laterality Date   • CHOLECYSTECTOMY     • STENT PLACEMENT      drug eluding stent placement.     Family History   Problem Relation Age of Onset   • No Known Problems Mother    • No Known Problems Sister    • No Known Problems Brother    • Diabetes Maternal Grandmother    • Hyperlipidemia Paternal Grandmother    • Hypertension Paternal Grandmother    • Heart Disease Paternal Grandmother    • Diabetes Paternal Grandmother    • Cancer Neg Hx    • Stroke Neg Hx      Social History     Socioeconomic History   • Marital status:      Spouse name: Not on file   • Number of children: Not on file   • Years of education: Not on file   • Highest  education level: Some college, no degree   Occupational History   • Not on file   Tobacco Use   • Smoking status: Former     Current packs/day: 0.00     Types: Cigarettes, Electronic Cigarettes     Quit date: 2/15/2022     Years since quittin.8   • Smokeless tobacco: Never   Vaping Use   • Vaping status: Former   Substance and Sexual Activity   • Alcohol use: Yes     Alcohol/week: 0.6 oz     Types: 1 Cans of beer per week     Comment: Occ   • Drug use: Never   • Sexual activity: Yes     Partners: Female     Birth control/protection: I.U.D.   Other Topics Concern   • Not on file   Social History Narrative   • Not on file     Social Drivers of Health     Financial Resource Strain: Low Risk  (10/23/2023)    Overall Financial Resource Strain (CARDIA)    • Difficulty of Paying Living Expenses: Not very hard   Food Insecurity: No Food Insecurity (10/23/2023)    Hunger Vital Sign    • Worried About Running Out of Food in the Last Year: Never true    • Ran Out of Food in the Last Year: Never true   Transportation Needs: No Transportation Needs (10/23/2023)    PRAPARE - Transportation    • Lack of Transportation (Medical): No    • Lack of Transportation (Non-Medical): No   Physical Activity: Sufficiently Active (10/23/2023)    Exercise Vital Sign    • Days of Exercise per Week: 4 days    • Minutes of Exercise per Session: 60 min   Stress: No Stress Concern Present (10/23/2023)    [place] [employer] - Occupational Stress Questionnaire    • Feeling of Stress : Not at all   Social Connections: Moderately Integrated (10/23/2023)    Social Connection and Isolation Panel [NHANES]    • Frequency of Communication with Friends and Family: More than three times a week    • Frequency of Social Gatherings with Friends and Family: More than three times a week    • Attends Confucianism Services: More than 4 times per year    • Active Member of Clubs or Organizations: No    • Attends Club or Organization Meetings:  Never    • Marital Status:    Intimate Partner Violence: Not on file   Housing Stability: Low Risk  (10/23/2023)    Housing Stability Vital Sign    • Unable to Pay for Housing in the Last Year: No    • Number of Places Lived in the Last Year: 1    • Unstable Housing in the Last Year: No     No Known Allergies    (Medications reviewed.)  Outpatient Encounter Medications as of 12/6/2024   Medication Sig Dispense Refill   • metoprolol tartrate (LOPRESSOR) 25 MG Tab Take 1 Tablet by mouth every day. 90 Tablet 3   • lisinopril (PRINIVIL) 5 MG Tab Take 1 Tablet by mouth every day. 90 Tablet 3   • atorvastatin (LIPITOR) 80 MG tablet Take 1 Tablet by mouth every evening. 90 Tablet 3   • Dulaglutide (TRULICITY) 0.75 MG/0.5ML Solution Pen-injector Inject 0.5 mL under the skin every 7 days. 6 mL 3   • sildenafil citrate (VIAGRA) 50 MG tablet Take 1 Tablet by mouth 1 time a day as needed for Erectile Dysfunction. 10 Tablet 11   • Omega-3 Fatty Acids (FISH OIL BURP-LESS PO)      • omeprazole (PRILOSEC) 20 MG delayed-release capsule Take 1 Capsule by mouth every day.     • aspirin EC (ECOTRIN) 81 MG Tablet Delayed Response Take 1 Tablet by mouth every day.     • [DISCONTINUED] metoprolol tartrate (LOPRESSOR) 25 MG Tab Take 1 Tablet by mouth every day. 90 Tablet 3   • [DISCONTINUED] metformin (GLUCOPHAGE) 1000 MG tablet Take 1 Tablet by mouth 2 times a day with meals. (Patient not taking: Reported on 12/6/2024) 180 Tablet 3   • [DISCONTINUED] atorvastatin (LIPITOR) 80 MG tablet Take 1 Tablet by mouth every evening. 90 Tablet 3   • [DISCONTINUED] lisinopril (PRINIVIL) 5 MG Tab Take 1 Tablet by mouth every day. 90 Tablet 3   • [DISCONTINUED] BRILINTA 90 MG Tab tablet TAKE 1 TABLET TWICE A  Tablet 3     No facility-administered encounter medications on file as of 12/6/2024.     Review of Systems   Constitutional: Negative.  Negative for chills, fever, malaise/fatigue and weight loss.   HENT: Negative.  Negative for  "hearing loss.    Eyes: Negative.  Negative for blurred vision and double vision.   Respiratory: Negative.  Negative for cough and shortness of breath.    Cardiovascular: Negative.  Negative for chest pain, palpitations, claudication and leg swelling.   Gastrointestinal: Negative.  Negative for abdominal pain, nausea and vomiting.   Genitourinary: Negative.  Negative for dysuria and urgency.   Musculoskeletal: Negative.  Negative for joint pain and myalgias.   Skin: Negative.  Negative for itching and rash.   Neurological: Negative.  Negative for dizziness, focal weakness, weakness and headaches.   Endo/Heme/Allergies: Negative.  Does not bruise/bleed easily.   Psychiatric/Behavioral: Negative.  Negative for depression. The patient is not nervous/anxious.               Objective     /70 (BP Location: Left arm, Patient Position: Sitting, BP Cuff Size: Adult)   Pulse 99   Resp 16   Ht 1.727 m (5' 8\")   Wt 88.5 kg (195 lb)   SpO2 97%   BMI 29.65 kg/m²     Physical Exam  Constitutional:       Appearance: Normal appearance. He is well-developed and normal weight.   HENT:      Head: Normocephalic and atraumatic.   Neck:      Vascular: No JVD.   Cardiovascular:      Rate and Rhythm: Normal rate and regular rhythm.      Heart sounds: Normal heart sounds.   Pulmonary:      Effort: Pulmonary effort is normal.      Breath sounds: Normal breath sounds.   Abdominal:      General: Bowel sounds are normal.      Palpations: Abdomen is soft.      Comments: No hepatosplenomegaly.   Musculoskeletal:         General: Normal range of motion.   Lymphadenopathy:      Cervical: No cervical adenopathy.   Skin:     General: Skin is warm and dry.   Neurological:      Mental Status: He is alert and oriented to person, place, and time.          CARDIAC STUDIES/PROCEDURES:    CARDIAC CATHETERIZATION CONCLUSIONS [place], [place] (02/16/22)  Cardiac catheterization with placement of 2.75 x 26 mm Resolute drug eluding stent to left anterior " descending artery and 2.5 x 26 mm Resolute drug eluding stent to diagonal branch.   (study result reviewed)     ECHOCARDIOGRAM CONCLUSIONS (11/16/22)  Left ventricular systolic function is normal. The left ventricular   ejection fraction is visually estimated to be 55%.  Normal right ventricular size and systolic function.  No significant valvular abnormalities.   No prior study is available for comparison.   (study result reviewed)     EKG performed on (06/21/23) was reviewed: EKG personally interpreted shows sinus rhythm.     MYOCARDIAL PERFUSION STUDY CONCLUSIONS (11/16/22)  NUCLEAR IMAGING INTERPRETATION  No myocardial infarct or reversible ischemia.  Normal LEFT ventricular function.  ECG INTERPRETATION  Negative treadmill EKG for ischemia.   The patient exercised for 6 minutes, 0 seconds, and 7.1 METs.  Average exercise capacity for age and sex.  Normal blood pressure response to exercise.    [facility] treadmill score -0.5 (medium risk, assuming no prior coronary artery disease)  (study result reviewed)    Assessment & Plan     1. Coronary artery disease due to calcified coronary lesion        2. S/P drug eluting coronary stent placement        3. Hypertension associated with type 2 diabetes mellitus (HCC)        4. Hyperlipidemia associated with type 2 diabetes mellitus (HCC)  Comp Metabolic Panel    Lipid Profile      5. Primary hypertension  metoprolol tartrate (LOPRESSOR) 25 MG Tab    lisinopril (PRINIVIL) 5 MG Tab      6. Hypertriglyceridemia  atorvastatin (LIPITOR) 80 MG tablet          Medical Decision Making: Today's Assessment/Status/Plan:        Coronary artery disease with stent placement: He is a 34 year old male with coronary artery disease with prior stent placement, hypertension and hyperlipidemia.  He is clinically doing well without recurrence of his angina. We will continue with current medical care including aspirin, metoprolol, lisinopril and atorvastatin.  Hypertension: Currently, the average  blood pressure is well controlled. We will continue with beta blockade therapy and ace inhibitor therapy.  Hyperlipidemia: He is doing well on statin therapy without myalgia symptoms. We will repeat labs including fasting lipid profile.   Greater than 45 minutes of time was spent to review all above information; of which more than 50% of the time was face to face reviewing thee patient's medical issues, medication evaluation, study result review, lab results review     We will follow up in 3 months.    CC Liza Diaz

## 2025-01-20 ENCOUNTER — RESEARCH ENCOUNTER (OUTPATIENT)
Dept: RESEARCH | Facility: MEDICAL CENTER | Age: 35
End: 2025-01-20

## 2025-02-20 ENCOUNTER — TELEPHONE (OUTPATIENT)
Dept: CARDIOLOGY | Facility: MEDICAL CENTER | Age: 35
End: 2025-02-20
Payer: COMMERCIAL

## 2025-02-21 NOTE — TELEPHONE ENCOUNTER
Called pt in regards to lab work that was ordered at previous OV. Patient states that he got lab work done through [facility]. He will bring and send us a copy of the results. Pt has follow up appointment scheduled with ARINA on 3/6/25.

## 2025-02-28 ENCOUNTER — HOSPITAL ENCOUNTER (OUTPATIENT)
Dept: LAB | Facility: MEDICAL CENTER | Age: 35
End: 2025-02-28
Attending: INTERNAL MEDICINE
Payer: COMMERCIAL

## 2025-02-28 DIAGNOSIS — E78.5 HYPERLIPIDEMIA ASSOCIATED WITH TYPE 2 DIABETES MELLITUS (HCC): ICD-10-CM

## 2025-02-28 DIAGNOSIS — E11.69 HYPERLIPIDEMIA ASSOCIATED WITH TYPE 2 DIABETES MELLITUS (HCC): ICD-10-CM

## 2025-02-28 LAB
ALBUMIN SERPL BCP-MCNC: 4.5 G/DL (ref 3.2–4.9)
ALBUMIN/GLOB SERPL: 1.5 G/DL
ALP SERPL-CCNC: 49 U/L (ref 30–99)
ALT SERPL-CCNC: 39 U/L (ref 2–50)
ANION GAP SERPL CALC-SCNC: 11 MMOL/L (ref 7–16)
AST SERPL-CCNC: 25 U/L (ref 12–45)
BILIRUB SERPL-MCNC: 0.9 MG/DL (ref 0.1–1.5)
BUN SERPL-MCNC: 10 MG/DL (ref 8–22)
CALCIUM ALBUM COR SERPL-MCNC: 9.1 MG/DL (ref 8.5–10.5)
CALCIUM SERPL-MCNC: 9.5 MG/DL (ref 8.5–10.5)
CHLORIDE SERPL-SCNC: 100 MMOL/L (ref 96–112)
CHOLEST SERPL-MCNC: 153 MG/DL (ref 100–199)
CO2 SERPL-SCNC: 26 MMOL/L (ref 20–33)
CREAT SERPL-MCNC: 0.91 MG/DL (ref 0.5–1.4)
GFR SERPLBLD CREATININE-BSD FMLA CKD-EPI: 113 ML/MIN/1.73 M 2
GLOBULIN SER CALC-MCNC: 3.1 G/DL (ref 1.9–3.5)
GLUCOSE SERPL-MCNC: 109 MG/DL (ref 65–99)
HDLC SERPL-MCNC: 36 MG/DL
LDLC SERPL CALC-MCNC: 67 MG/DL
POTASSIUM SERPL-SCNC: 4 MMOL/L (ref 3.6–5.5)
PROT SERPL-MCNC: 7.6 G/DL (ref 6–8.2)
SODIUM SERPL-SCNC: 137 MMOL/L (ref 135–145)
TRIGL SERPL-MCNC: 248 MG/DL (ref 0–149)

## 2025-02-28 PROCEDURE — 36415 COLL VENOUS BLD VENIPUNCTURE: CPT

## 2025-02-28 PROCEDURE — 80053 COMPREHEN METABOLIC PANEL: CPT

## 2025-02-28 PROCEDURE — 80061 LIPID PANEL: CPT

## 2025-03-03 ENCOUNTER — RESULTS FOLLOW-UP (OUTPATIENT)
Dept: CARDIOLOGY | Facility: MEDICAL CENTER | Age: 35
End: 2025-03-03

## 2025-03-06 ENCOUNTER — OFFICE VISIT (OUTPATIENT)
Dept: CARDIOLOGY | Facility: MEDICAL CENTER | Age: 35
End: 2025-03-06
Attending: INTERNAL MEDICINE
Payer: COMMERCIAL

## 2025-03-06 VITALS
RESPIRATION RATE: 16 BRPM | SYSTOLIC BLOOD PRESSURE: 112 MMHG | HEIGHT: 68 IN | DIASTOLIC BLOOD PRESSURE: 68 MMHG | OXYGEN SATURATION: 98 % | WEIGHT: 196 LBS | BODY MASS INDEX: 29.7 KG/M2 | HEART RATE: 96 BPM

## 2025-03-06 DIAGNOSIS — E11.59 HYPERTENSION ASSOCIATED WITH TYPE 2 DIABETES MELLITUS (HCC): ICD-10-CM

## 2025-03-06 DIAGNOSIS — E11.69 HYPERLIPIDEMIA ASSOCIATED WITH TYPE 2 DIABETES MELLITUS (HCC): ICD-10-CM

## 2025-03-06 DIAGNOSIS — I25.84 CORONARY ARTERY DISEASE DUE TO CALCIFIED CORONARY LESION: ICD-10-CM

## 2025-03-06 DIAGNOSIS — I15.2 HYPERTENSION ASSOCIATED WITH TYPE 2 DIABETES MELLITUS (HCC): ICD-10-CM

## 2025-03-06 DIAGNOSIS — E78.5 HYPERLIPIDEMIA ASSOCIATED WITH TYPE 2 DIABETES MELLITUS (HCC): ICD-10-CM

## 2025-03-06 DIAGNOSIS — I25.10 CORONARY ARTERY DISEASE DUE TO CALCIFIED CORONARY LESION: ICD-10-CM

## 2025-03-06 PROBLEM — I25.119 ATHEROSCLEROTIC HEART DISEASE OF NATIVE CORONARY ARTERY WITH UNSPECIFIED ANGINA PECTORIS (HCC): Status: RESOLVED | Noted: 2022-02-23 | Resolved: 2025-03-06

## 2025-03-06 LAB — EKG IMPRESSION: NORMAL

## 2025-03-06 PROCEDURE — 93005 ELECTROCARDIOGRAM TRACING: CPT | Mod: TC | Performed by: INTERNAL MEDICINE

## 2025-03-06 PROCEDURE — 99212 OFFICE O/P EST SF 10 MIN: CPT | Performed by: INTERNAL MEDICINE

## 2025-03-06 PROCEDURE — 3074F SYST BP LT 130 MM HG: CPT | Performed by: INTERNAL MEDICINE

## 2025-03-06 PROCEDURE — 93010 ELECTROCARDIOGRAM REPORT: CPT | Performed by: INTERNAL MEDICINE

## 2025-03-06 PROCEDURE — 3078F DIAST BP <80 MM HG: CPT | Performed by: INTERNAL MEDICINE

## 2025-03-06 PROCEDURE — 99214 OFFICE O/P EST MOD 30 MIN: CPT | Performed by: INTERNAL MEDICINE

## 2025-03-06 RX ORDER — FENOFIBRATE 145 MG/1
145 TABLET, COATED ORAL DAILY
Qty: 90 TABLET | Refills: 3 | Status: SHIPPED | OUTPATIENT
Start: 2025-03-06

## 2025-03-06 ASSESSMENT — ENCOUNTER SYMPTOMS
NEUROLOGICAL NEGATIVE: 1
CLAUDICATION: 0
BLURRED VISION: 0
FOCAL WEAKNESS: 0
WEAKNESS: 0
MYALGIAS: 0
WEIGHT LOSS: 0
GASTROINTESTINAL NEGATIVE: 1
PALPITATIONS: 0
CARDIOVASCULAR NEGATIVE: 1
COUGH: 0
DEPRESSION: 0
RESPIRATORY NEGATIVE: 1
FEVER: 0
NERVOUS/ANXIOUS: 0
MUSCULOSKELETAL NEGATIVE: 1
ABDOMINAL PAIN: 0
NAUSEA: 0
SHORTNESS OF BREATH: 0
DIZZINESS: 0
HEADACHES: 0
EYES NEGATIVE: 1
VOMITING: 0
CHILLS: 0
BRUISES/BLEEDS EASILY: 0
PSYCHIATRIC NEGATIVE: 1
DOUBLE VISION: 0
CONSTITUTIONAL NEGATIVE: 1

## 2025-03-06 ASSESSMENT — FIBROSIS 4 INDEX: FIB4 SCORE: 0.44

## 2025-03-06 NOTE — PROGRESS NOTES
Chief Complaint   Patient presents with    Coronary Artery Disease     F/V Dx:Atherosclerotic heart disease of native coronary artery with unspecified angina pectoris (HCC)      Hyperlipidemia     F/V Dx: Hyperlipidemia associated with type 2 diabetes mellitus (HCC)         Subjective     Isidro Crow is a 34 y.o. male who presents today for follow up of coronary artery disease with prior stent placement, hypertension and hyperlipidemia.    Since the patient's last visit on 12/06/24, he has been doing well clinically from cardiac standpoint. He denies fatigue, chest pain, shortness of breath, palpitations, lower extremity edema, dizziness or syncope. He keeps active walking at his job. He visited [facility] and is planning to visit [facility] this year.     Past Medical History:   Diagnosis Date    CAD (coronary artery disease)     Chest pain 11/15/2022    Cigarette nicotine dependence in remission 06/16/2023    He quit after his MI with the support of a nicotine patch in February 2022. He started smoking when he was 22 and smoked 4-5 cigarettes a day. He denies cravings.    Disease due to severe acute respiratory syndrome coronavirus 2 (SARS-CoV-2) 05/19/2022    Dyslipidemia 06/21/2023    Hypertension     MI (myocardial infarction) (HCC)      Past Surgical History:   Procedure Laterality Date    ANGIOPLASTY      CHOLECYSTECTOMY      STENT PLACEMENT      drug eluding stent placement.    ZZZ CARDIAC CATH       Family History   Problem Relation Age of Onset    No Known Problems Mother     No Known Problems Sister     No Known Problems Brother     Diabetes Maternal Grandmother     Hyperlipidemia Paternal Grandmother     Hypertension Paternal Grandmother     Heart Disease Paternal Grandmother     Diabetes Paternal Grandmother     Cancer Neg Hx     Stroke Neg Hx     Heart Attack Neg Hx      Social History     Socioeconomic History    Marital status:      Spouse name: Not on file    Number of children:  Not on file    Years of education: Not on file    Highest education level: Some college, no degree   Occupational History    Not on file   Tobacco Use    Smoking status: Former     Current packs/day: 0.00     Types: Cigarettes, Electronic Cigarettes     Quit date: 2/15/2022     Years since quitting: 3.0    Smokeless tobacco: Never   Vaping Use    Vaping status: Former   Substance and Sexual Activity    Alcohol use: Yes     Alcohol/week: 0.6 oz     Types: 1 Cans of beer per week     Comment: Occ    Drug use: Never    Sexual activity: Yes     Partners: Female     Birth control/protection: I.U.D.   Other Topics Concern    Not on file   Social History Narrative    Not on file     Social Drivers of Health     Financial Resource Strain: Low Risk  (10/23/2023)    Overall Financial Resource Strain (CARDIA)     Difficulty of Paying Living Expenses: Not very hard   Food Insecurity: No Food Insecurity (10/23/2023)    Hunger Vital Sign     Worried About Running Out of Food in the Last Year: Never true     Ran Out of Food in the Last Year: Never true   Transportation Needs: No Transportation Needs (10/23/2023)    PRAPARE - Transportation     Lack of Transportation (Medical): No     Lack of Transportation (Non-Medical): No   Physical Activity: Sufficiently Active (10/23/2023)    Exercise Vital Sign     Days of Exercise per Week: 4 days     Minutes of Exercise per Session: 60 min   Stress: No Stress Concern Present (10/23/2023)    Citizen of the [place] of Occupational Health - Occupational Stress Questionnaire     Feeling of Stress : Not at all   Social Connections: Moderately Integrated (10/23/2023)    Social Connection and Isolation Panel [NHANES]     Frequency of Communication with Friends and Family: More than three times a week     Frequency of Social Gatherings with Friends and Family: More than three times a week     Attends Jehovah's witness Services: More than 4 times per year     Active Member of Clubs or Organizations: No      Attends Club or Organization Meetings: Never     Marital Status:    Intimate Partner Violence: Not on file   Housing Stability: Low Risk  (10/23/2023)    Housing Stability Vital Sign     Unable to Pay for Housing in the Last Year: No     Number of Places Lived in the Last Year: 1     Unstable Housing in the Last Year: No     No Known Allergies  (Medications reviewed.)  Outpatient Encounter Medications as of 3/6/2025   Medication Sig Dispense Refill    metoprolol tartrate (LOPRESSOR) 25 MG Tab Take 1 Tablet by mouth every day. 90 Tablet 3    lisinopril (PRINIVIL) 5 MG Tab Take 1 Tablet by mouth every day. 90 Tablet 3    atorvastatin (LIPITOR) 80 MG tablet Take 1 Tablet by mouth every evening. 90 Tablet 3    Dulaglutide (TRULICITY) 0.75 MG/0.5ML Solution Pen-injector Inject 0.5 mL under the skin every 7 days. 6 mL 3    sildenafil citrate (VIAGRA) 50 MG tablet Take 1 Tablet by mouth 1 time a day as needed for Erectile Dysfunction. 10 Tablet 11    Omega-3 Fatty Acids (FISH OIL BURP-LESS PO)       omeprazole (PRILOSEC) 20 MG delayed-release capsule Take 1 Capsule by mouth every day.      aspirin EC (ECOTRIN) 81 MG Tablet Delayed Response Take 1 Tablet by mouth every day.       No facility-administered encounter medications on file as of 3/6/2025.     Review of Systems   Constitutional: Negative.  Negative for chills, fever, malaise/fatigue and weight loss.   HENT: Negative.  Negative for hearing loss.    Eyes: Negative.  Negative for blurred vision and double vision.   Respiratory: Negative.  Negative for cough and shortness of breath.    Cardiovascular: Negative.  Negative for chest pain, palpitations, claudication and leg swelling.   Gastrointestinal: Negative.  Negative for abdominal pain, nausea and vomiting.   Genitourinary: Negative.  Negative for dysuria and urgency.   Musculoskeletal: Negative.  Negative for joint pain and myalgias.   Skin: Negative.  Negative for itching and rash.   Neurological:  "Negative.  Negative for dizziness, focal weakness, weakness and headaches.   Endo/Heme/Allergies: Negative.  Does not bruise/bleed easily.   Psychiatric/Behavioral: Negative.  Negative for depression. The patient is not nervous/anxious.               Objective     /68 (BP Location: Left arm, Patient Position: Sitting, BP Cuff Size: Adult)   Pulse 96   Resp 16   Ht 1.727 m (5' 8\")   Wt 88.9 kg (196 lb)   SpO2 98%   BMI 29.80 kg/m²     Physical Exam  Constitutional:       Appearance: Normal appearance. He is well-developed and normal weight.   HENT:      Head: Normocephalic and atraumatic.   Neck:      Vascular: No JVD.   Cardiovascular:      Rate and Rhythm: Normal rate and regular rhythm.      Heart sounds: Normal heart sounds.   Pulmonary:      Effort: Pulmonary effort is normal.      Breath sounds: Normal breath sounds.   Abdominal:      General: Bowel sounds are normal.      Palpations: Abdomen is soft.      Comments: No hepatosplenomegaly.   Musculoskeletal:         General: Normal range of motion.   Lymphadenopathy:      Cervical: No cervical adenopathy.   Skin:     General: Skin is warm and dry.   Neurological:      Mental Status: He is alert and oriented to person, place, and time.            CARDIAC STUDIES/PROCEDURES:     CARDIAC CATHETERIZATION CONCLUSIONS [place], [place] (02/16/22)  Cardiac catheterization with placement of 2.75 x 26 mm Resolute drug eluding stent to left anterior descending artery and 2.5 x 26 mm Resolute drug eluding stent to diagonal branch.     ECHOCARDIOGRAM CONCLUSIONS (11/16/22)  Left ventricular systolic function is normal. The left ventricular   ejection fraction is visually estimated to be 55%.  Normal right ventricular size and systolic function.  No significant valvular abnormalities.   No prior study is available for comparison.     EKG performed on (06/21/23) EKG personally interpreted shows sinus rhythm.      Laboratory results of (02/28/25) were reviewed. " Cholesterol profile of 153/248/36/67 mg/dL noted.    MYOCARDIAL PERFUSION STUDY CONCLUSIONS (11/16/22)  NUCLEAR IMAGING INTERPRETATION  No myocardial infarct or reversible ischemia.  Normal LEFT ventricular function.  ECG INTERPRETATION  Negative treadmill EKG for ischemia.   The patient exercised for 6 minutes, 0 seconds, and 7.1 METs.  Average exercise capacity for age and sex.  Normal blood pressure response to exercise.    [facility] treadmill score -0.5 (medium risk, assuming no prior coronary artery disease)    Assessment & Plan     1. Coronary artery disease due to calcified coronary lesion        2. Hypertension associated with type 2 diabetes mellitus (HCC)        3. Hyperlipidemia associated with type 2 diabetes mellitus (HCC)  EKG          Medical Decision Making: Today's Assessment/Status/Plan:        Coronary artery disease: He is a 34 year old male with coronary artery disease with prior stent placement, hypertension and hyperlipidemia. He is clinically doing well from coronary standpoint. I will continue with current medical care including aspirin, metoprolol, lisinopril and atorvastatin.  Hypertension: Currently, the average blood pressure is well controlled. We will continue with beta blockade therapy and ace inhibitor therapy.  Hyperlipidemia: He is doing well on statin therapy without myalgia symptoms. He will reduce his carbohydrate intake. We will start fenofibrate. We will repeat labs including fasting lipid profile.   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     We will follow up in 3 months.    CC Liza Diaz

## 2025-04-08 DIAGNOSIS — E11.59 TYPE 2 DIABETES MELLITUS WITH OTHER CIRCULATORY COMPLICATION, WITHOUT LONG-TERM CURRENT USE OF INSULIN (HCC): ICD-10-CM

## 2025-04-08 RX ORDER — DULAGLUTIDE 0.75 MG/.5ML
0.5 INJECTION, SOLUTION SUBCUTANEOUS
Qty: 3 ML | Refills: 3 | Status: SHIPPED | OUTPATIENT
Start: 2025-04-08

## 2025-04-08 NOTE — TELEPHONE ENCOUNTER
Received request via: Patient    Was the patient seen in the last year in this department? No    Does the patient have an active prescription (recently filled or refills available) for medication(s) requested? No    Pharmacy Name: [facility]    Does the patient have half-way Plus and need 100-day supply? (This applies to ALL medications) Patient does not have SCP

## 2025-05-28 ENCOUNTER — HOSPITAL ENCOUNTER (OUTPATIENT)
Dept: LAB | Facility: MEDICAL CENTER | Age: 35
End: 2025-05-28
Attending: INTERNAL MEDICINE
Payer: COMMERCIAL

## 2025-05-28 DIAGNOSIS — E78.5 HYPERLIPIDEMIA ASSOCIATED WITH TYPE 2 DIABETES MELLITUS (HCC): ICD-10-CM

## 2025-05-28 DIAGNOSIS — E11.69 HYPERLIPIDEMIA ASSOCIATED WITH TYPE 2 DIABETES MELLITUS (HCC): ICD-10-CM

## 2025-05-28 LAB
ALBUMIN SERPL BCP-MCNC: 4.4 G/DL (ref 3.2–4.9)
ALBUMIN/GLOB SERPL: 1.4 G/DL
ALP SERPL-CCNC: 39 U/L (ref 30–99)
ALT SERPL-CCNC: 37 U/L (ref 2–50)
ANION GAP SERPL CALC-SCNC: 10 MMOL/L (ref 7–16)
AST SERPL-CCNC: 27 U/L (ref 12–45)
BILIRUB SERPL-MCNC: 0.4 MG/DL (ref 0.1–1.5)
BUN SERPL-MCNC: 16 MG/DL (ref 8–22)
CALCIUM ALBUM COR SERPL-MCNC: 8.7 MG/DL (ref 8.5–10.5)
CALCIUM SERPL-MCNC: 9 MG/DL (ref 8.5–10.5)
CHLORIDE SERPL-SCNC: 104 MMOL/L (ref 96–112)
CHOLEST SERPL-MCNC: 145 MG/DL (ref 100–199)
CO2 SERPL-SCNC: 23 MMOL/L (ref 20–33)
CREAT SERPL-MCNC: 1.17 MG/DL (ref 0.5–1.4)
GFR SERPLBLD CREATININE-BSD FMLA CKD-EPI: 84 ML/MIN/1.73 M 2
GLOBULIN SER CALC-MCNC: 3.2 G/DL (ref 1.9–3.5)
GLUCOSE SERPL-MCNC: 112 MG/DL (ref 65–99)
HDLC SERPL-MCNC: 40 MG/DL
LDLC SERPL CALC-MCNC: 86 MG/DL
POTASSIUM SERPL-SCNC: 4 MMOL/L (ref 3.6–5.5)
PROT SERPL-MCNC: 7.6 G/DL (ref 6–8.2)
SODIUM SERPL-SCNC: 137 MMOL/L (ref 135–145)
TRIGL SERPL-MCNC: 94 MG/DL (ref 0–149)

## 2025-05-28 PROCEDURE — 80053 COMPREHEN METABOLIC PANEL: CPT

## 2025-05-28 PROCEDURE — 36415 COLL VENOUS BLD VENIPUNCTURE: CPT

## 2025-05-28 PROCEDURE — 80061 LIPID PANEL: CPT

## 2025-05-29 ENCOUNTER — RESULTS FOLLOW-UP (OUTPATIENT)
Dept: CARDIOLOGY | Facility: MEDICAL CENTER | Age: 35
End: 2025-05-29

## 2025-05-29 ENCOUNTER — APPOINTMENT (OUTPATIENT)
Dept: CARDIOLOGY | Facility: MEDICAL CENTER | Age: 35
End: 2025-05-29
Attending: INTERNAL MEDICINE
Payer: COMMERCIAL

## 2025-05-30 NOTE — RESEARCH NOTE
Closing this encounter as future consent outreach has been taken over directly by [facility].

## 2025-06-02 NOTE — PROGRESS NOTES
Chief Complaint   Patient presents with    Coronary Artery Disease     F/V Dx:Atherosclerotic heart disease of native coronary artery with unspecified angina pectoris (HCC)      Hyperlipidemia     F/V Dx: Hyperlipidemia associated with type 2 diabetes mellitus (HCC)       Subjective     Isidro Crow is a 34 y.o. male who presents today for follow up of coronary artery disease with prior stent placement (2022), hypertension and hyperlipidemia.     He is a patient of Dr. Porter and last clinic visit was 3/6/2025.    Since the patient's last visit , he has been doing well from cardiac standpoint. He denies fatigue, chest pain, shortness of breath, palpitations, lower extremity edema, dizziness or syncope. He keeps active walking at his job. He is not taking his BP at home, but controlled din office today. He does not smoke and kinza drink a beer socially. He is visiting [place] this weekend and plan to visit [facility] in July.      Past Medical History:   Diagnosis Date    CAD (coronary artery disease)     Chest pain 11/15/2022    Cigarette nicotine dependence in remission 06/16/2023    He quit after his MI with the support of a nicotine patch in February 2022. He started smoking when he was 22 and smoked 4-5 cigarettes a day. He denies cravings.    Disease due to severe acute respiratory syndrome coronavirus 2 (SARS-CoV-2) 05/19/2022    Dyslipidemia 06/21/2023    Hypertension     MI (myocardial infarction) (HCC)      Past Surgical History:   Procedure Laterality Date    ANGIOPLASTY      CHOLECYSTECTOMY      STENT PLACEMENT      drug eluding stent placement.    ZZZ CARDIAC CATH       Family History   Problem Relation Age of Onset    No Known Problems Mother     No Known Problems Sister     No Known Problems Brother     Diabetes Maternal Grandmother     Hyperlipidemia Paternal Grandmother     Hypertension Paternal Grandmother     Heart Disease Paternal Grandmother     Diabetes Paternal Grandmother      Cancer Neg Hx     Stroke Neg Hx     Heart Attack Neg Hx      Social History     Socioeconomic History    Marital status:      Spouse name: Not on file    Number of children: Not on file    Years of education: Not on file    Highest education level: Some college, no degree   Occupational History    Not on file   Tobacco Use    Smoking status: Former     Current packs/day: 0.00     Types: Cigarettes, Electronic Cigarettes     Quit date: 2/15/2022     Years since quitting: 3.0    Smokeless tobacco: Never   Vaping Use    Vaping status: Former   Substance and Sexual Activity    Alcohol use: Yes     Alcohol/week: 0.6 oz     Types: 1 Cans of beer per week     Comment: Occ    Drug use: Never    Sexual activity: Yes     Partners: Female     Birth control/protection: I.U.D.   Other Topics Concern    Not on file   Social History Narrative    Not on file     Social Drivers of Health     Financial Resource Strain: Low Risk  (10/23/2023)    Overall Financial Resource Strain (CARDIA)     Difficulty of Paying Living Expenses: Not very hard   Food Insecurity: No Food Insecurity (10/23/2023)    Hunger Vital Sign     Worried About Running Out of Food in the Last Year: Never true     Ran Out of Food in the Last Year: Never true   Transportation Needs: No Transportation Needs (10/23/2023)    PRAPARE - Transportation     Lack of Transportation (Medical): No     Lack of Transportation (Non-Medical): No   Physical Activity: Sufficiently Active (10/23/2023)    Exercise Vital Sign     Days of Exercise per Week: 4 days     Minutes of Exercise per Session: 60 min   Stress: No Stress Concern Present (10/23/2023)    [place] [employer] - Occupational Stress Questionnaire     Feeling of Stress : Not at all   Social Connections: Moderately Integrated (10/23/2023)    Social Connection and Isolation Panel [NHANES]     Frequency of Communication with Friends and Family: More than three times a week     Frequency of Social  Gatherings with Friends and Family: More than three times a week     Attends Gnosticist Services: More than 4 times per year     Active Member of Clubs or Organizations: No     Attends Club or Organization Meetings: Never     Marital Status:    Intimate Partner Violence: Not on file   Housing Stability: Low Risk  (10/23/2023)    Housing Stability Vital Sign     Unable to Pay for Housing in the Last Year: No     Number of Places Lived in the Last Year: 1     Unstable Housing in the Last Year: No     No Known Allergies  (Medications reviewed.)  Outpatient Encounter Medications as of 3/6/2025   Medication Sig Dispense Refill    metoprolol tartrate (LOPRESSOR) 25 MG Tab Take 1 Tablet by mouth every day. 90 Tablet 3    lisinopril (PRINIVIL) 5 MG Tab Take 1 Tablet by mouth every day. 90 Tablet 3    atorvastatin (LIPITOR) 80 MG tablet Take 1 Tablet by mouth every evening. 90 Tablet 3    Dulaglutide (TRULICITY) 0.75 MG/0.5ML Solution Pen-injector Inject 0.5 mL under the skin every 7 days. 6 mL 3    sildenafil citrate (VIAGRA) 50 MG tablet Take 1 Tablet by mouth 1 time a day as needed for Erectile Dysfunction. 10 Tablet 11    Omega-3 Fatty Acids (FISH OIL BURP-LESS PO)       omeprazole (PRILOSEC) 20 MG delayed-release capsule Take 1 Capsule by mouth every day.      aspirin EC (ECOTRIN) 81 MG Tablet Delayed Response Take 1 Tablet by mouth every day.       No facility-administered encounter medications on file as of 3/6/2025.     Review of Systems   Constitutional: Negative.  Negative for chills, fever, malaise/fatigue and weight loss.   HENT: Negative.  Negative for hearing loss.    Eyes: Negative.  Negative for blurred vision and double vision.   Respiratory: Negative.  Negative for cough and shortness of breath.    Cardiovascular: Negative.  Negative for chest pain, palpitations, claudication and leg swelling.   Gastrointestinal: Negative.  Negative for abdominal pain, nausea and vomiting.   Genitourinary: Negative.   Negative for dysuria and urgency.   Musculoskeletal: Negative.  Negative for joint pain and myalgias.   Skin: Negative.  Negative for itching and rash.   Neurological: Negative.  Negative for dizziness, focal weakness, weakness and headaches.   Endo/Heme/Allergies: Negative.  Does not bruise/bleed easily.   Psychiatric/Behavioral: Negative.  Negative for depression. The patient is not nervous/anxious.      Objective     Vitals:    06/03/25 0753   BP: 112/74   Pulse: 80   Resp: 16   SpO2: 99%     Physical Exam  Constitutional:       Appearance: Normal appearance. He is well-developed and normal weight.   HENT:      Head: Normocephalic and atraumatic.   Neck:      Vascular: No JVD.   Cardiovascular:      Rate and Rhythm: Normal rate and regular rhythm.      Heart sounds: Normal heart sounds.   Pulmonary:      Effort: Pulmonary effort is normal.      Breath sounds: Normal breath sounds.   Abdominal:      General: Bowel sounds are normal.      Palpations: Abdomen is soft.      Comments: No hepatosplenomegaly.   Musculoskeletal:         General: Normal range of motion.   Lymphadenopathy:      Cervical: No cervical adenopathy.   Skin:     General: Skin is warm and dry.   Neurological:      Mental Status: He is alert and oriented to person, place, and time.      CARDIAC STUDIES/PROCEDURES     CARDIAC CATHETERIZATION CONCLUSIONS [place], [place] (02/16/22)  Cardiac catheterization with placement of 2.75 x 26 mm Resolute drug eluding stent to left anterior descending artery and 2.5 x 26 mm Resolute drug eluding stent to diagonal branch.     ECHOCARDIOGRAM CONCLUSIONS (11/16/22)  Left ventricular systolic function is normal. The left ventricular   ejection fraction is visually estimated to be 55%.  Normal right ventricular size and systolic function.  No significant valvular abnormalities.   No prior study is available for comparison.     EKG performed on (06/21/23) EKG shows sinus rhythm.      Laboratory results of (02/28/25)  were reviewed. Cholesterol profile of 153/248/36/67 mg/dL noted.    MYOCARDIAL PERFUSION STUDY CONCLUSIONS (11/16/22)  NUCLEAR IMAGING INTERPRETATION  No myocardial infarct or reversible ischemia.  Normal LEFT ventricular function.  ECG INTERPRETATION  Negative treadmill EKG for ischemia.   The patient exercised for 6 minutes, 0 seconds, and 7.1 METs.  Average exercise capacity for age and sex.  Normal blood pressure response to exercise.    [facility] treadmill score -0.5 (medium risk, assuming no prior coronary artery disease)    Assessment & Plan     1. Coronary artery disease due to calcified coronary lesion        2. Hypertension associated with type 2 diabetes mellitus (HCC)        3. Hyperlipidemia associated with type 2 diabetes mellitus (HCC)  EKG        Medical Decision Making: Today's Assessment/Status/Plan:     Coronary artery disease: He is a 34 year old male with coronary artery disease with prior stent placement, hypertension and hyperlipidemia. He is doing well from coronary standpoint. Continue current medical care including aspirin, metoprolol, lisinopril and atorvastatin. Encouraged him to start working out outside of his work.  Hypertension: Blood pressure is well controlled. Continue with beta blockade therapy and ace inhibitor therapy.  Hyperlipidemia: He is doing well on statin therapy without myalgia symptoms. He will reduce his carbohydrate intake. Continue fenofibrate. Repeat lipid showed LDL at 86, above goal. Start Zetia. Lipid panel in 3 months. MP in 10 days.  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     Follow up in 3 months, sooner if problems.

## 2025-06-03 ENCOUNTER — OFFICE VISIT (OUTPATIENT)
Dept: CARDIOLOGY | Facility: MEDICAL CENTER | Age: 35
End: 2025-06-03
Attending: NURSE PRACTITIONER
Payer: COMMERCIAL

## 2025-06-03 VITALS
OXYGEN SATURATION: 99 % | BODY MASS INDEX: 30.31 KG/M2 | HEART RATE: 80 BPM | SYSTOLIC BLOOD PRESSURE: 112 MMHG | DIASTOLIC BLOOD PRESSURE: 74 MMHG | WEIGHT: 200 LBS | RESPIRATION RATE: 16 BRPM | HEIGHT: 68 IN

## 2025-06-03 DIAGNOSIS — E78.5 DYSLIPIDEMIA: ICD-10-CM

## 2025-06-03 DIAGNOSIS — I25.84 CORONARY ARTERY DISEASE DUE TO CALCIFIED CORONARY LESION: Primary | ICD-10-CM

## 2025-06-03 DIAGNOSIS — I25.10 CORONARY ARTERY DISEASE DUE TO CALCIFIED CORONARY LESION: Primary | ICD-10-CM

## 2025-06-03 PROCEDURE — 99214 OFFICE O/P EST MOD 30 MIN: CPT | Performed by: NURSE PRACTITIONER

## 2025-06-03 PROCEDURE — 99213 OFFICE O/P EST LOW 20 MIN: CPT | Performed by: NURSE PRACTITIONER

## 2025-06-03 RX ORDER — SITAGLIPTIN 100 MG/1
TABLET, FILM COATED ORAL
COMMUNITY
Start: 2025-04-24

## 2025-06-03 RX ORDER — EZETIMIBE 10 MG/1
10 TABLET ORAL DAILY
Qty: 90 TABLET | Refills: 3 | Status: SHIPPED | OUTPATIENT
Start: 2025-06-03

## 2025-06-03 ASSESSMENT — FIBROSIS 4 INDEX: FIB4 SCORE: 0.49

## 2025-06-03 NOTE — PATIENT INSTRUCTIONS
Complete metabolic panel in 10 days     Zetia    Cholesterol panel in 3 months    See me in 3 months, a few days after your labs    START WORKING OUT OUTSIDE OF WORK

## 2025-08-27 ENCOUNTER — HOSPITAL ENCOUNTER (OUTPATIENT)
Dept: LAB | Facility: MEDICAL CENTER | Age: 35
End: 2025-08-27
Attending: NURSE PRACTITIONER
Payer: COMMERCIAL

## 2025-08-27 DIAGNOSIS — I25.84 CORONARY ARTERY DISEASE DUE TO CALCIFIED CORONARY LESION: ICD-10-CM

## 2025-08-27 DIAGNOSIS — E78.5 DYSLIPIDEMIA: ICD-10-CM

## 2025-08-27 DIAGNOSIS — I25.10 CORONARY ARTERY DISEASE DUE TO CALCIFIED CORONARY LESION: ICD-10-CM

## 2025-08-27 LAB
ALBUMIN SERPL BCP-MCNC: 4.6 G/DL (ref 3.2–4.9)
ALBUMIN/GLOB SERPL: 1.6 G/DL
ALP SERPL-CCNC: 33 U/L (ref 30–99)
ALT SERPL-CCNC: 32 U/L (ref 2–50)
ANION GAP SERPL CALC-SCNC: 11 MMOL/L (ref 7–16)
AST SERPL-CCNC: 22 U/L (ref 12–45)
BILIRUB SERPL-MCNC: 0.6 MG/DL (ref 0.1–1.5)
BUN SERPL-MCNC: 10 MG/DL (ref 8–22)
CALCIUM ALBUM COR SERPL-MCNC: 9.2 MG/DL (ref 8.5–10.5)
CALCIUM SERPL-MCNC: 9.7 MG/DL (ref 8.5–10.5)
CHLORIDE SERPL-SCNC: 104 MMOL/L (ref 96–112)
CHOLEST SERPL-MCNC: 116 MG/DL (ref 100–199)
CO2 SERPL-SCNC: 24 MMOL/L (ref 20–33)
CREAT SERPL-MCNC: 1.04 MG/DL (ref 0.5–1.4)
GFR SERPLBLD CREATININE-BSD FMLA CKD-EPI: 96 ML/MIN/1.73 M 2
GLOBULIN SER CALC-MCNC: 2.9 G/DL (ref 1.9–3.5)
GLUCOSE SERPL-MCNC: 97 MG/DL (ref 65–99)
HDLC SERPL-MCNC: 41 MG/DL
LDLC SERPL CALC-MCNC: 58 MG/DL
POTASSIUM SERPL-SCNC: 4 MMOL/L (ref 3.6–5.5)
PROT SERPL-MCNC: 7.5 G/DL (ref 6–8.2)
SODIUM SERPL-SCNC: 139 MMOL/L (ref 135–145)
TRIGL SERPL-MCNC: 85 MG/DL (ref 0–149)

## 2025-08-27 PROCEDURE — 80061 LIPID PANEL: CPT

## 2025-08-27 PROCEDURE — 36415 COLL VENOUS BLD VENIPUNCTURE: CPT

## 2025-08-27 PROCEDURE — 80053 COMPREHEN METABOLIC PANEL: CPT

## 2025-08-28 ENCOUNTER — RESULTS FOLLOW-UP (OUTPATIENT)
Dept: CARDIOLOGY | Facility: MEDICAL CENTER | Age: 35
End: 2025-08-28
Payer: COMMERCIAL